# Patient Record
Sex: FEMALE | Race: WHITE | NOT HISPANIC OR LATINO | Employment: UNEMPLOYED | URBAN - NONMETROPOLITAN AREA
[De-identification: names, ages, dates, MRNs, and addresses within clinical notes are randomized per-mention and may not be internally consistent; named-entity substitution may affect disease eponyms.]

---

## 2017-11-13 ENCOUNTER — HOSPITAL ENCOUNTER (OUTPATIENT)
Dept: GENERAL RADIOLOGY | Facility: HOSPITAL | Age: 52
Discharge: HOME | End: 2017-11-13
Admitting: INTERNAL MEDICINE

## 2017-11-13 ENCOUNTER — TRANSCRIBE ORDERS (OUTPATIENT)
Dept: ADMINISTRATIVE | Facility: HOSPITAL | Age: 52
End: 2017-11-13

## 2017-11-13 DIAGNOSIS — L40.9 PSORIASIS: ICD-10-CM

## 2017-11-13 DIAGNOSIS — M15.0 PRIMARY GENERALIZED (OSTEO)ARTHRITIS: ICD-10-CM

## 2017-11-13 DIAGNOSIS — M13.0 POLYARTHROPATHY: Primary | ICD-10-CM

## 2017-11-13 PROCEDURE — 73630 X-RAY EXAM OF FOOT: CPT

## 2017-11-13 PROCEDURE — 73120 X-RAY EXAM OF HAND: CPT

## 2018-04-23 ENCOUNTER — HOSPITAL ENCOUNTER (INPATIENT)
Facility: HOSPITAL | Age: 53
LOS: 6 days | Discharge: HOME | End: 2018-04-30
Attending: EMERGENCY MEDICINE | Admitting: INTERNAL MEDICINE

## 2018-04-23 DIAGNOSIS — Z78.9 FAILURE OF OUTPATIENT TREATMENT: ICD-10-CM

## 2018-04-23 DIAGNOSIS — G47.34 NOCTURNAL HYPOXIA: ICD-10-CM

## 2018-04-23 DIAGNOSIS — L03.115 CELLULITIS OF RIGHT LOWER EXTREMITY: Primary | ICD-10-CM

## 2018-04-23 DIAGNOSIS — J44.9 CHRONIC OBSTRUCTIVE PULMONARY DISEASE, UNSPECIFIED COPD TYPE (HCC): Chronic | ICD-10-CM

## 2018-04-23 DIAGNOSIS — J45.909 ASTHMA, UNSPECIFIED ASTHMA SEVERITY, UNSPECIFIED WHETHER COMPLICATED, UNSPECIFIED WHETHER PERSISTENT: Chronic | ICD-10-CM

## 2018-04-23 DIAGNOSIS — Z59.00 HOMELESS: ICD-10-CM

## 2018-04-23 DIAGNOSIS — E66.01 MORBID OBESITY WITH BMI OF 50.0-59.9, ADULT (HCC): Chronic | ICD-10-CM

## 2018-04-23 LAB
BASOPHILS # BLD AUTO: 0.03 10*3/MM3 (ref 0–0.2)
BASOPHILS NFR BLD AUTO: 0.5 % (ref 0–1)
DEPRECATED RDW RBC AUTO: 49.2 FL (ref 37–54)
EOSINOPHIL # BLD AUTO: 0.09 10*3/MM3 (ref 0–0.3)
EOSINOPHIL NFR BLD AUTO: 1.4 % (ref 0–3)
ERYTHROCYTE [DISTWIDTH] IN BLOOD BY AUTOMATED COUNT: 15.4 % (ref 11.3–14.5)
HCT VFR BLD AUTO: 39.9 % (ref 34.5–44)
HGB BLD-MCNC: 12.1 G/DL (ref 11.5–15.5)
IMM GRANULOCYTES # BLD: 0.04 10*3/MM3 (ref 0–0.03)
IMM GRANULOCYTES NFR BLD: 0.6 % (ref 0–0.6)
LYMPHOCYTES # BLD AUTO: 1.81 10*3/MM3 (ref 0.6–4.8)
LYMPHOCYTES NFR BLD AUTO: 27.6 % (ref 24–44)
MCH RBC QN AUTO: 26.5 PG (ref 27–31)
MCHC RBC AUTO-ENTMCNC: 30.3 G/DL (ref 32–36)
MCV RBC AUTO: 87.3 FL (ref 80–99)
MONOCYTES # BLD AUTO: 0.7 10*3/MM3 (ref 0–1)
MONOCYTES NFR BLD AUTO: 10.7 % (ref 0–12)
NEUTROPHILS # BLD AUTO: 3.93 10*3/MM3 (ref 1.5–8.3)
NEUTROPHILS NFR BLD AUTO: 59.8 % (ref 41–71)
PLATELET # BLD AUTO: 256 10*3/MM3 (ref 150–450)
PMV BLD AUTO: 9.5 FL (ref 6–12)
RBC # BLD AUTO: 4.57 10*6/MM3 (ref 3.89–5.14)
WBC NRBC COR # BLD: 6.56 10*3/MM3 (ref 3.5–10.8)

## 2018-04-23 PROCEDURE — 25010000002 CEFTRIAXONE PER 250 MG: Performed by: EMERGENCY MEDICINE

## 2018-04-23 PROCEDURE — 80053 COMPREHEN METABOLIC PANEL: CPT | Performed by: EMERGENCY MEDICINE

## 2018-04-23 PROCEDURE — 99284 EMERGENCY DEPT VISIT MOD MDM: CPT

## 2018-04-23 PROCEDURE — 25010000002 VANCOMYCIN: Performed by: EMERGENCY MEDICINE

## 2018-04-23 PROCEDURE — 87040 BLOOD CULTURE FOR BACTERIA: CPT | Performed by: EMERGENCY MEDICINE

## 2018-04-23 PROCEDURE — 85025 COMPLETE CBC W/AUTO DIFF WBC: CPT | Performed by: EMERGENCY MEDICINE

## 2018-04-23 RX ORDER — SODIUM CHLORIDE 9 MG/ML
125 INJECTION, SOLUTION INTRAVENOUS CONTINUOUS
Status: DISCONTINUED | OUTPATIENT
Start: 2018-04-23 | End: 2018-04-24

## 2018-04-23 RX ORDER — SODIUM CHLORIDE 0.9 % (FLUSH) 0.9 %
10 SYRINGE (ML) INJECTION AS NEEDED
Status: DISCONTINUED | OUTPATIENT
Start: 2018-04-23 | End: 2018-04-30 | Stop reason: HOSPADM

## 2018-04-23 RX ORDER — FOLIC ACID 1 MG/1
1 TABLET ORAL DAILY
COMMUNITY

## 2018-04-23 RX ORDER — CEFTRIAXONE SODIUM 1 G/50ML
1 INJECTION, SOLUTION INTRAVENOUS ONCE
Status: COMPLETED | OUTPATIENT
Start: 2018-04-23 | End: 2018-04-23

## 2018-04-23 RX ORDER — ALBUTEROL SULFATE 90 UG/1
2 AEROSOL, METERED RESPIRATORY (INHALATION) EVERY 4 HOURS PRN
COMMUNITY
End: 2018-06-28 | Stop reason: SDUPTHER

## 2018-04-23 RX ADMIN — CEFTRIAXONE SODIUM 1 G: 1 INJECTION, SOLUTION INTRAVENOUS at 22:34

## 2018-04-23 RX ADMIN — VANCOMYCIN HYDROCHLORIDE 2000 MG: 10 INJECTION, POWDER, LYOPHILIZED, FOR SOLUTION INTRAVENOUS at 23:10

## 2018-04-23 RX ADMIN — SODIUM CHLORIDE 125 ML/HR: 9 INJECTION, SOLUTION INTRAVENOUS at 22:32

## 2018-04-23 SDOH — ECONOMIC STABILITY - HOUSING INSECURITY: HOMELESSNESS UNSPECIFIED: Z59.00

## 2018-04-24 ENCOUNTER — APPOINTMENT (OUTPATIENT)
Dept: CARDIOLOGY | Facility: HOSPITAL | Age: 53
End: 2018-04-24

## 2018-04-24 PROBLEM — J44.9 COPD (CHRONIC OBSTRUCTIVE PULMONARY DISEASE) (HCC): Chronic | Status: ACTIVE | Noted: 2018-04-24

## 2018-04-24 PROBLEM — J45.909 ASTHMA: Chronic | Status: ACTIVE | Noted: 2018-04-24

## 2018-04-24 PROBLEM — Z59.00 HOMELESSNESS: Chronic | Status: ACTIVE | Noted: 2018-04-24

## 2018-04-24 PROBLEM — L03.90 CELLULITIS: Status: ACTIVE | Noted: 2018-04-24

## 2018-04-24 PROBLEM — L40.50 PSORIATIC ARTHRITIS (HCC): Chronic | Status: ACTIVE | Noted: 2018-04-24

## 2018-04-24 PROBLEM — B37.2 INTERTRIGINOUS CANDIDIASIS: Chronic | Status: ACTIVE | Noted: 2018-04-24

## 2018-04-24 PROBLEM — I10 HTN (HYPERTENSION): Chronic | Status: ACTIVE | Noted: 2018-04-24

## 2018-04-24 PROBLEM — E78.5 HLD (HYPERLIPIDEMIA): Chronic | Status: ACTIVE | Noted: 2018-04-24

## 2018-04-24 PROBLEM — L03.115 CELLULITIS OF RIGHT LOWER EXTREMITY: Status: ACTIVE | Noted: 2018-04-24

## 2018-04-24 PROBLEM — E66.01 MORBID OBESITY WITH BMI OF 50.0-59.9, ADULT (HCC): Chronic | Status: ACTIVE | Noted: 2018-04-24

## 2018-04-24 LAB
ALBUMIN SERPL-MCNC: 3.6 G/DL (ref 3.2–4.8)
ALBUMIN/GLOB SERPL: 0.9 G/DL (ref 1.5–2.5)
ALP SERPL-CCNC: 106 U/L (ref 25–100)
ALT SERPL W P-5'-P-CCNC: 14 U/L (ref 7–40)
AMPHET+METHAMPHET UR QL: NEGATIVE
AMPHETAMINES UR QL: NEGATIVE
ANION GAP SERPL CALCULATED.3IONS-SCNC: 4 MMOL/L (ref 3–11)
ARTICHOKE IGE QN: 117 MG/DL (ref 0–130)
AST SERPL-CCNC: 15 U/L (ref 0–33)
BARBITURATES UR QL SCN: NEGATIVE
BENZODIAZ UR QL SCN: NEGATIVE
BH CV ECHO MEAS - AO ROOT AREA (BSA CORRECTED): 1.1
BH CV ECHO MEAS - AO ROOT AREA: 5.2 CM^2
BH CV ECHO MEAS - AO ROOT DIAM: 2.6 CM
BH CV ECHO MEAS - BSA(HAYCOCK): 2.5 M^2
BH CV ECHO MEAS - BSA: 2.2 M^2
BH CV ECHO MEAS - BZI_BMI: 53.4 KILOGRAMS/M^2
BH CV ECHO MEAS - BZI_METRIC_HEIGHT: 157.5 CM
BH CV ECHO MEAS - BZI_METRIC_WEIGHT: 132.5 KG
BH CV ECHO MEAS - EDV(CUBED): 128 ML
BH CV ECHO MEAS - EDV(TEICH): 120.4 ML
BH CV ECHO MEAS - EF(CUBED): 66.3 %
BH CV ECHO MEAS - EF(TEICH): 57.6 %
BH CV ECHO MEAS - ESV(CUBED): 43.1 ML
BH CV ECHO MEAS - ESV(TEICH): 51.1 ML
BH CV ECHO MEAS - FS: 30.4 %
BH CV ECHO MEAS - IVS/LVPW: 1
BH CV ECHO MEAS - IVSD: 1.3 CM
BH CV ECHO MEAS - LA DIMENSION: 4.4 CM
BH CV ECHO MEAS - LA/AO: 1.7
BH CV ECHO MEAS - LV MASS(C)D: 269.3 GRAMS
BH CV ECHO MEAS - LV MASS(C)DI: 119.9 GRAMS/M^2
BH CV ECHO MEAS - LVIDD: 5 CM
BH CV ECHO MEAS - LVIDS: 3.5 CM
BH CV ECHO MEAS - LVPWD: 1.3 CM
BH CV ECHO MEAS - MV A MAX VEL: 85.4 CM/SEC
BH CV ECHO MEAS - MV DEC SLOPE: 621.5 CM/SEC^2
BH CV ECHO MEAS - MV DEC TIME: 0.19 SEC
BH CV ECHO MEAS - MV E MAX VEL: 114.5 CM/SEC
BH CV ECHO MEAS - MV E/A: 1.3
BH CV ECHO MEAS - MV P1/2T MAX VEL: 123.7 CM/SEC
BH CV ECHO MEAS - MV P1/2T: 58.3 MSEC
BH CV ECHO MEAS - MVA P1/2T LCG: 1.8 CM^2
BH CV ECHO MEAS - MVA(P1/2T): 3.8 CM^2
BH CV ECHO MEAS - PA ACC SLOPE: 914.3 CM/SEC^2
BH CV ECHO MEAS - PA ACC TIME: 0.1 SEC
BH CV ECHO MEAS - PA PR(ACCEL): 35 MMHG
BH CV ECHO MEAS - RV MAX PG: 2.5 MMHG
BH CV ECHO MEAS - RV V1 MAX: 78.5 CM/SEC
BH CV ECHO MEAS - SI(CUBED): 37.8 ML/M^2
BH CV ECHO MEAS - SI(TEICH): 30.9 ML/M^2
BH CV ECHO MEAS - SV(CUBED): 84.8 ML
BH CV ECHO MEAS - SV(TEICH): 69.3 ML
BH CV ECHO MEAS - TAPSE (>1.6): 2.1 CM2
BH CV GRAFT BRACHIAL PRESSURE LEFT: 116 MMHG
BH CV LEA LEFT ANT TIBIAL A DISTAL PSV: 85.5 CM/S
BH CV LEA LEFT ANT TIBIAL A MID PSV: 81.7 CM/S
BH CV LEA LEFT ANT TIBIAL A PROX PSV: 95.5 CM/S
BH CV LEA LEFT CFA DISTAL PSV: 93 CM/S
BH CV LEA LEFT DFA PROX PSV: 70.4 CM/S
BH CV LEA LEFT PERONEAL  MID PSV: 103 CM/S
BH CV LEA LEFT POPITEAL A  DISTAL PSV: 107 CM/S
BH CV LEA LEFT POPITEAL A  PROX PSV: 128 CM/S
BH CV LEA LEFT PTA DISTAL PSV: 70.4 CM/S
BH CV LEA LEFT PTA MID PSV: 75.3 CM/S
BH CV LEA LEFT PTA PRESSURE: 138 MMHG
BH CV LEA LEFT PTA PROX PSV: 76.3 CM/S
BH CV LEA LEFT SFA DISTAL PSV: 106 CM/S
BH CV LEA LEFT SFA MID PSV: 121 CM/S
BH CV LEA LEFT SFA PROX PSV: 104 CM/S
BH CV LEA RIGHT ANT TIBIAL A DISTAL PSV: 87 CM/S
BH CV LEA RIGHT ANT TIBIAL A MID PSV: 98.8 CM/S
BH CV LEA RIGHT ANT TIBIAL A PROX PSV: 154 CM/S
BH CV LEA RIGHT CFA DISTAL PSV: 145 CM/S
BH CV LEA RIGHT DFA PROX PSV: 77.8 CM/S
BH CV LEA RIGHT PERONEAL  MID PSV: 102 CM/S
BH CV LEA RIGHT POPITEAL A  DISTAL PSV: 130 CM/S
BH CV LEA RIGHT POPITEAL A  PROX PSV: 121 CM/S
BH CV LEA RIGHT PTA DISTAL PSV: 95.8 CM/S
BH CV LEA RIGHT PTA MID PSV: 150 CM/S
BH CV LEA RIGHT PTA PRESSURE: 154 MMHG
BH CV LEA RIGHT PTA PROX PSV: 135 CM/S
BH CV LEA RIGHT SFA DISTAL PSV: 131 CM/S
BH CV LEA RIGHT SFA MID PSV: 155 CM/S
BH CV LEA RIGHT SFA PROX PSV: 113 CM/S
BH CV LOWER ARTERIAL LEFT ABI RATIO: 1.2
BH CV LOWER ARTERIAL RIGHT ABI RATIO: 1.3
BH CV LOWER VASCULAR LEFT COMMON FEMORAL AUGMENT: NORMAL
BH CV LOWER VASCULAR LEFT COMMON FEMORAL COMPRESS: NORMAL
BH CV LOWER VASCULAR LEFT COMMON FEMORAL PHASIC: NORMAL
BH CV LOWER VASCULAR LEFT COMMON FEMORAL SPONT: NORMAL
BH CV LOWER VASCULAR LEFT DISTAL FEMORAL COMPRESS: NORMAL
BH CV LOWER VASCULAR LEFT GASTRONEMIUS COMPRESS: NORMAL
BH CV LOWER VASCULAR LEFT GREATER SAPH AK COMPRESS: NORMAL
BH CV LOWER VASCULAR LEFT GREATER SAPH BK COMPRESS: NORMAL
BH CV LOWER VASCULAR LEFT LESSER SAPH COMPRESS: NORMAL
BH CV LOWER VASCULAR LEFT MID FEMORAL AUGMENT: NORMAL
BH CV LOWER VASCULAR LEFT MID FEMORAL COMPRESS: NORMAL
BH CV LOWER VASCULAR LEFT MID FEMORAL PHASIC: NORMAL
BH CV LOWER VASCULAR LEFT MID FEMORAL SPONT: NORMAL
BH CV LOWER VASCULAR LEFT PERONEAL COMPRESS: NORMAL
BH CV LOWER VASCULAR LEFT POPLITEAL AUGMENT: NORMAL
BH CV LOWER VASCULAR LEFT POPLITEAL COMPRESS: NORMAL
BH CV LOWER VASCULAR LEFT POPLITEAL PHASIC: NORMAL
BH CV LOWER VASCULAR LEFT POPLITEAL SPONT: NORMAL
BH CV LOWER VASCULAR LEFT POSTERIOR TIBIAL COMPRESS: NORMAL
BH CV LOWER VASCULAR LEFT PROXIMAL FEMORAL COMPRESS: NORMAL
BH CV LOWER VASCULAR LEFT SAPHENOFEMORAL JUNCTION AUGMENT: NORMAL
BH CV LOWER VASCULAR LEFT SAPHENOFEMORAL JUNCTION COMPRESS: NORMAL
BH CV LOWER VASCULAR LEFT SAPHENOFEMORAL JUNCTION PHASIC: NORMAL
BH CV LOWER VASCULAR LEFT SAPHENOFEMORAL JUNCTION SPONT: NORMAL
BH CV LOWER VASCULAR RIGHT COMMON FEMORAL AUGMENT: NORMAL
BH CV LOWER VASCULAR RIGHT COMMON FEMORAL COMPRESS: NORMAL
BH CV LOWER VASCULAR RIGHT COMMON FEMORAL PHASIC: NORMAL
BH CV LOWER VASCULAR RIGHT COMMON FEMORAL SPONT: NORMAL
BH CV LOWER VASCULAR RIGHT DISTAL FEMORAL COMPRESS: NORMAL
BH CV LOWER VASCULAR RIGHT GASTRONEMIUS COMPRESS: NORMAL
BH CV LOWER VASCULAR RIGHT GREATER SAPH AK COMPRESS: NORMAL
BH CV LOWER VASCULAR RIGHT GREATER SAPH BK COMPRESS: NORMAL
BH CV LOWER VASCULAR RIGHT LESSER SAPH COMPRESS: NORMAL
BH CV LOWER VASCULAR RIGHT MID FEMORAL AUGMENT: NORMAL
BH CV LOWER VASCULAR RIGHT MID FEMORAL COMPRESS: NORMAL
BH CV LOWER VASCULAR RIGHT MID FEMORAL PHASIC: NORMAL
BH CV LOWER VASCULAR RIGHT MID FEMORAL SPONT: NORMAL
BH CV LOWER VASCULAR RIGHT PERONEAL COMPRESS: NORMAL
BH CV LOWER VASCULAR RIGHT POPLITEAL AUGMENT: NORMAL
BH CV LOWER VASCULAR RIGHT POPLITEAL COMPRESS: NORMAL
BH CV LOWER VASCULAR RIGHT POPLITEAL PHASIC: NORMAL
BH CV LOWER VASCULAR RIGHT POPLITEAL SPONT: NORMAL
BH CV LOWER VASCULAR RIGHT POSTERIOR TIBIAL COMPRESS: NORMAL
BH CV LOWER VASCULAR RIGHT PROXIMAL FEMORAL COMPRESS: NORMAL
BH CV LOWER VASCULAR RIGHT SAPHENOFEMORAL JUNCTION AUGMENT: NORMAL
BH CV LOWER VASCULAR RIGHT SAPHENOFEMORAL JUNCTION COMPRESS: NORMAL
BH CV LOWER VASCULAR RIGHT SAPHENOFEMORAL JUNCTION PHASIC: NORMAL
BH CV LOWER VASCULAR RIGHT SAPHENOFEMORAL JUNCTION SPONT: NORMAL
BH CV POP FLUID COLLECT LEFT: 1
BH CV XLRA - RV BASE: 4 CM
BH CV XLRA - RV LENGTH: 7.4 CM
BH CV XLRA - RV MID: 3.7 CM
BH CV XLRA - TDI S': 11.7 CM/SEC
BILIRUB SERPL-MCNC: 0.4 MG/DL (ref 0.3–1.2)
BILIRUB UR QL STRIP: NEGATIVE
BNP SERPL-MCNC: 64 PG/ML (ref 0–100)
BUN BLD-MCNC: 11 MG/DL (ref 9–23)
BUN/CREAT SERPL: 13.8 (ref 7–25)
BUPRENORPHINE SERPL-MCNC: NEGATIVE NG/ML
CALCIUM SPEC-SCNC: 8.9 MG/DL (ref 8.7–10.4)
CANNABINOIDS SERPL QL: POSITIVE
CHLORIDE SERPL-SCNC: 106 MMOL/L (ref 99–109)
CHOLEST SERPL-MCNC: 165 MG/DL (ref 0–200)
CLARITY UR: CLEAR
CO2 SERPL-SCNC: 29 MMOL/L (ref 20–31)
COCAINE UR QL: NEGATIVE
COLOR UR: YELLOW
CREAT BLD-MCNC: 0.8 MG/DL (ref 0.6–1.3)
CRP SERPL-MCNC: 0.87 MG/DL (ref 0–1)
DIST ATA PSV LEFT: -86.1 CM/SEC
DIST POP A PSV LEFT: -107.5 CM/SEC
DIST POP A PSV RIGHT: 130.4 CM/SEC
DIST SFA PSV LEFT: -106.2 CM/SEC
DIST SFA PSV RIGHT: -132 CM/SEC
ERYTHROCYTE [SEDIMENTATION RATE] IN BLOOD: 65 MM/HR (ref 0–30)
GFR SERPL CREATININE-BSD FRML MDRD: 76 ML/MIN/1.73
GLOBULIN UR ELPH-MCNC: 4 GM/DL
GLUCOSE BLD-MCNC: 95 MG/DL (ref 70–100)
GLUCOSE UR STRIP-MCNC: NEGATIVE MG/DL
HBA1C MFR BLD: 5.7 % (ref 4.8–5.6)
HDLC SERPL-MCNC: 27 MG/DL (ref 40–60)
HGB UR QL STRIP.AUTO: NEGATIVE
KETONES UR QL STRIP: NEGATIVE
LEFT ATRIUM VOLUME INDEX: 13.8 ML/M2
LEFT ATRIUM VOLUME: 31 CM3
LEFT CFA PROX SYS PSV: 93.7 CM/SEC
LEUKOCYTE ESTERASE UR QL STRIP.AUTO: NEGATIVE
LV EF 2D ECHO EST: 60 %
MAGNESIUM SERPL-MCNC: 2 MG/DL (ref 1.3–2.7)
MAXIMAL PREDICTED HEART RATE: 169 BPM
METHADONE UR QL SCN: NEGATIVE
MID ATA PSV LEFT: -82.3 CM/SEC
MID SFA PSV LEFT: -121.3 CM/SEC
MID SFA PSV RIGHT: -155.6 CM/SEC
NITRITE UR QL STRIP: NEGATIVE
OPIATES UR QL: NEGATIVE
OXYCODONE UR QL SCN: NEGATIVE
PCP UR QL SCN: NEGATIVE
PH UR STRIP.AUTO: 5.5 [PH] (ref 5–8)
POTASSIUM BLD-SCNC: 3.9 MMOL/L (ref 3.5–5.5)
PROCALCITONIN SERPL-MCNC: <0.05 NG/ML
PROPOXYPH UR QL: NEGATIVE
PROT SERPL-MCNC: 7.6 G/DL (ref 5.7–8.2)
PROT UR QL STRIP: NEGATIVE
PROX ATA PSV LEFT: -96.2 CM/SEC
PROX PFA PSV LEFT: -71 CM/SEC
PROX PFA PSV RIGHT: -78.6 CM/SEC
PROX POP A PSV LEFT: 128.9 CM/SEC
PROX POP A PSV RIGHT: 121.8 CM/SEC
PROX SFA PSV LEFT: -105 CM/SEC
PROX SFA PSV RIGHT: 113.9 CM/SEC
RIGHT CFA PROX SYS PSV: 145.4 CM/SEC
SODIUM BLD-SCNC: 139 MMOL/L (ref 132–146)
SP GR UR STRIP: 1.01 (ref 1–1.03)
STRESS TARGET HR: 144 BPM
TRICYCLICS UR QL SCN: NEGATIVE
TRIGL SERPL-MCNC: 174 MG/DL (ref 0–150)
TROPONIN I SERPL-MCNC: <0.006 NG/ML
TSH SERPL DL<=0.05 MIU/L-ACNC: 2.33 MIU/ML (ref 0.35–5.35)
UROBILINOGEN UR QL STRIP: NORMAL

## 2018-04-24 PROCEDURE — 99223 1ST HOSP IP/OBS HIGH 75: CPT | Performed by: INTERNAL MEDICINE

## 2018-04-24 PROCEDURE — 25010000003 CEFTRIAXONE PER 250 MG

## 2018-04-24 PROCEDURE — 84145 PROCALCITONIN (PCT): CPT | Performed by: NURSE PRACTITIONER

## 2018-04-24 PROCEDURE — 83735 ASSAY OF MAGNESIUM: CPT | Performed by: NURSE PRACTITIONER

## 2018-04-24 PROCEDURE — 80306 DRUG TEST PRSMV INSTRMNT: CPT | Performed by: NURSE PRACTITIONER

## 2018-04-24 PROCEDURE — 85652 RBC SED RATE AUTOMATED: CPT | Performed by: NURSE PRACTITIONER

## 2018-04-24 PROCEDURE — 84443 ASSAY THYROID STIM HORMONE: CPT | Performed by: NURSE PRACTITIONER

## 2018-04-24 PROCEDURE — 25010000002 VANCOMYCIN

## 2018-04-24 PROCEDURE — 94799 UNLISTED PULMONARY SVC/PX: CPT

## 2018-04-24 PROCEDURE — 93306 TTE W/DOPPLER COMPLETE: CPT | Performed by: INTERNAL MEDICINE

## 2018-04-24 PROCEDURE — 93970 EXTREMITY STUDY: CPT | Performed by: INTERNAL MEDICINE

## 2018-04-24 PROCEDURE — 93970 EXTREMITY STUDY: CPT

## 2018-04-24 PROCEDURE — 25010000002 HEPARIN (PORCINE) PER 1000 UNITS: Performed by: NURSE PRACTITIONER

## 2018-04-24 PROCEDURE — 93306 TTE W/DOPPLER COMPLETE: CPT

## 2018-04-24 PROCEDURE — 94640 AIRWAY INHALATION TREATMENT: CPT

## 2018-04-24 PROCEDURE — 83036 HEMOGLOBIN GLYCOSYLATED A1C: CPT | Performed by: NURSE PRACTITIONER

## 2018-04-24 PROCEDURE — 81003 URINALYSIS AUTO W/O SCOPE: CPT | Performed by: NURSE PRACTITIONER

## 2018-04-24 PROCEDURE — 83880 ASSAY OF NATRIURETIC PEPTIDE: CPT | Performed by: NURSE PRACTITIONER

## 2018-04-24 PROCEDURE — 93925 LOWER EXTREMITY STUDY: CPT

## 2018-04-24 PROCEDURE — 80061 LIPID PANEL: CPT | Performed by: NURSE PRACTITIONER

## 2018-04-24 PROCEDURE — 86140 C-REACTIVE PROTEIN: CPT | Performed by: NURSE PRACTITIONER

## 2018-04-24 PROCEDURE — 93925 LOWER EXTREMITY STUDY: CPT | Performed by: INTERNAL MEDICINE

## 2018-04-24 PROCEDURE — 84484 ASSAY OF TROPONIN QUANT: CPT | Performed by: NURSE PRACTITIONER

## 2018-04-24 RX ORDER — CEFTRIAXONE SODIUM 2 G/50ML
2 INJECTION, SOLUTION INTRAVENOUS EVERY 24 HOURS
Status: DISCONTINUED | OUTPATIENT
Start: 2018-04-24 | End: 2018-04-30 | Stop reason: HOSPADM

## 2018-04-24 RX ORDER — ACETAMINOPHEN 325 MG/1
650 TABLET ORAL EVERY 4 HOURS PRN
Status: DISCONTINUED | OUTPATIENT
Start: 2018-04-24 | End: 2018-04-30 | Stop reason: HOSPADM

## 2018-04-24 RX ORDER — BISACODYL 5 MG/1
5 TABLET, DELAYED RELEASE ORAL DAILY PRN
Status: DISCONTINUED | OUTPATIENT
Start: 2018-04-24 | End: 2018-04-30 | Stop reason: HOSPADM

## 2018-04-24 RX ORDER — ALBUTEROL SULFATE 2.5 MG/3ML
2.5 SOLUTION RESPIRATORY (INHALATION) 4 TIMES DAILY PRN
Status: DISCONTINUED | OUTPATIENT
Start: 2018-04-24 | End: 2018-04-30 | Stop reason: HOSPADM

## 2018-04-24 RX ORDER — CASTOR OIL AND BALSAM, PERU 788; 87 MG/G; MG/G
OINTMENT TOPICAL EVERY 12 HOURS SCHEDULED
Status: DISCONTINUED | OUTPATIENT
Start: 2018-04-24 | End: 2018-04-30 | Stop reason: HOSPADM

## 2018-04-24 RX ORDER — FOLIC ACID 1 MG/1
1 TABLET ORAL DAILY
Status: DISCONTINUED | OUTPATIENT
Start: 2018-04-24 | End: 2018-04-30 | Stop reason: HOSPADM

## 2018-04-24 RX ORDER — ONDANSETRON 4 MG/1
4 TABLET, FILM COATED ORAL EVERY 6 HOURS PRN
Status: DISCONTINUED | OUTPATIENT
Start: 2018-04-24 | End: 2018-04-30 | Stop reason: HOSPADM

## 2018-04-24 RX ORDER — CASTOR OIL AND BALSAM, PERU 788; 87 MG/G; MG/G
OINTMENT TOPICAL AS NEEDED
Status: DISCONTINUED | OUTPATIENT
Start: 2018-04-24 | End: 2018-04-30 | Stop reason: HOSPADM

## 2018-04-24 RX ORDER — L.ACID,PARA/B.BIFIDUM/S.THERM 8B CELL
1 CAPSULE ORAL DAILY
Status: DISCONTINUED | OUTPATIENT
Start: 2018-04-24 | End: 2018-04-30 | Stop reason: HOSPADM

## 2018-04-24 RX ORDER — IPRATROPIUM BROMIDE AND ALBUTEROL SULFATE 2.5; .5 MG/3ML; MG/3ML
3 SOLUTION RESPIRATORY (INHALATION)
Status: DISCONTINUED | OUTPATIENT
Start: 2018-04-24 | End: 2018-04-30 | Stop reason: HOSPADM

## 2018-04-24 RX ORDER — CEFTRIAXONE SODIUM 1 G/50ML
1 INJECTION, SOLUTION INTRAVENOUS EVERY 24 HOURS
Status: DISCONTINUED | OUTPATIENT
Start: 2018-04-24 | End: 2018-04-24

## 2018-04-24 RX ORDER — NYSTATIN 100000 [USP'U]/G
POWDER TOPICAL EVERY 12 HOURS SCHEDULED
Status: DISCONTINUED | OUTPATIENT
Start: 2018-04-24 | End: 2018-04-30 | Stop reason: HOSPADM

## 2018-04-24 RX ORDER — HEPARIN SODIUM 5000 [USP'U]/ML
5000 INJECTION, SOLUTION INTRAVENOUS; SUBCUTANEOUS EVERY 8 HOURS SCHEDULED
Status: DISCONTINUED | OUTPATIENT
Start: 2018-04-24 | End: 2018-04-30 | Stop reason: HOSPADM

## 2018-04-24 RX ORDER — DOCUSATE SODIUM 100 MG/1
100 CAPSULE, LIQUID FILLED ORAL 2 TIMES DAILY PRN
Status: DISCONTINUED | OUTPATIENT
Start: 2018-04-24 | End: 2018-04-30 | Stop reason: HOSPADM

## 2018-04-24 RX ORDER — FAMOTIDINE 20 MG/1
20 TABLET, FILM COATED ORAL 2 TIMES DAILY PRN
Status: DISCONTINUED | OUTPATIENT
Start: 2018-04-24 | End: 2018-04-30 | Stop reason: HOSPADM

## 2018-04-24 RX ORDER — SODIUM CHLORIDE 0.9 % (FLUSH) 0.9 %
1-10 SYRINGE (ML) INJECTION AS NEEDED
Status: DISCONTINUED | OUTPATIENT
Start: 2018-04-24 | End: 2018-04-30 | Stop reason: HOSPADM

## 2018-04-24 RX ORDER — ONDANSETRON 2 MG/ML
4 INJECTION INTRAMUSCULAR; INTRAVENOUS EVERY 6 HOURS PRN
Status: DISCONTINUED | OUTPATIENT
Start: 2018-04-24 | End: 2018-04-30 | Stop reason: HOSPADM

## 2018-04-24 RX ORDER — SODIUM CHLORIDE 9 MG/ML
125 INJECTION, SOLUTION INTRAVENOUS CONTINUOUS
Status: ACTIVE | OUTPATIENT
Start: 2018-04-24 | End: 2018-04-24

## 2018-04-24 RX ORDER — NICOTINE 21 MG/24HR
1 PATCH, TRANSDERMAL 24 HOURS TRANSDERMAL DAILY PRN
Status: DISCONTINUED | OUTPATIENT
Start: 2018-04-24 | End: 2018-04-30 | Stop reason: HOSPADM

## 2018-04-24 RX ADMIN — IPRATROPIUM BROMIDE AND ALBUTEROL SULFATE 3 ML: 2.5; .5 SOLUTION RESPIRATORY (INHALATION) at 19:55

## 2018-04-24 RX ADMIN — HEPARIN SODIUM 5000 UNITS: 5000 INJECTION, SOLUTION INTRAVENOUS; SUBCUTANEOUS at 05:18

## 2018-04-24 RX ADMIN — IPRATROPIUM BROMIDE AND ALBUTEROL SULFATE 3 ML: 2.5; .5 SOLUTION RESPIRATORY (INHALATION) at 12:13

## 2018-04-24 RX ADMIN — CASTOR OIL AND BALSAM, PERU: 788; 87 OINTMENT TOPICAL at 20:55

## 2018-04-24 RX ADMIN — CEFTRIAXONE SODIUM 2 G: 2 INJECTION, SOLUTION INTRAVENOUS at 20:53

## 2018-04-24 RX ADMIN — IPRATROPIUM BROMIDE AND ALBUTEROL SULFATE 3 ML: 2.5; .5 SOLUTION RESPIRATORY (INHALATION) at 23:37

## 2018-04-24 RX ADMIN — IPRATROPIUM BROMIDE AND ALBUTEROL SULFATE 3 ML: 2.5; .5 SOLUTION RESPIRATORY (INHALATION) at 06:47

## 2018-04-24 RX ADMIN — Medication 1 CAPSULE: at 08:52

## 2018-04-24 RX ADMIN — IPRATROPIUM BROMIDE AND ALBUTEROL SULFATE 3 ML: 2.5; .5 SOLUTION RESPIRATORY (INHALATION) at 15:26

## 2018-04-24 RX ADMIN — FOLIC ACID 1 MG: 1 TABLET ORAL at 08:52

## 2018-04-24 RX ADMIN — HEPARIN SODIUM 5000 UNITS: 5000 INJECTION, SOLUTION INTRAVENOUS; SUBCUTANEOUS at 20:54

## 2018-04-24 RX ADMIN — SODIUM CHLORIDE 125 ML/HR: 9 INJECTION, SOLUTION INTRAVENOUS at 05:13

## 2018-04-24 RX ADMIN — VANCOMYCIN HYDROCHLORIDE 1500 MG: 10 INJECTION, POWDER, LYOPHILIZED, FOR SOLUTION INTRAVENOUS at 13:33

## 2018-04-25 PROCEDURE — 25010000003 CEFTRIAXONE PER 250 MG

## 2018-04-25 PROCEDURE — 25010000002 HEPARIN (PORCINE) PER 1000 UNITS: Performed by: NURSE PRACTITIONER

## 2018-04-25 PROCEDURE — 94640 AIRWAY INHALATION TREATMENT: CPT

## 2018-04-25 PROCEDURE — 94799 UNLISTED PULMONARY SVC/PX: CPT

## 2018-04-25 PROCEDURE — 99233 SBSQ HOSP IP/OBS HIGH 50: CPT | Performed by: INTERNAL MEDICINE

## 2018-04-25 RX ADMIN — ACETAMINOPHEN 650 MG: 325 TABLET ORAL at 01:49

## 2018-04-25 RX ADMIN — FOLIC ACID 1 MG: 1 TABLET ORAL at 09:27

## 2018-04-25 RX ADMIN — IPRATROPIUM BROMIDE AND ALBUTEROL SULFATE 3 ML: 2.5; .5 SOLUTION RESPIRATORY (INHALATION) at 07:51

## 2018-04-25 RX ADMIN — CASTOR OIL AND BALSAM, PERU: 788; 87 OINTMENT TOPICAL at 21:36

## 2018-04-25 RX ADMIN — ACETAMINOPHEN 650 MG: 325 TABLET ORAL at 17:10

## 2018-04-25 RX ADMIN — HEPARIN SODIUM 5000 UNITS: 5000 INJECTION, SOLUTION INTRAVENOUS; SUBCUTANEOUS at 21:37

## 2018-04-25 RX ADMIN — IPRATROPIUM BROMIDE AND ALBUTEROL SULFATE 3 ML: 2.5; .5 SOLUTION RESPIRATORY (INHALATION) at 03:01

## 2018-04-25 RX ADMIN — NYSTATIN: 100000 POWDER TOPICAL at 09:27

## 2018-04-25 RX ADMIN — IPRATROPIUM BROMIDE AND ALBUTEROL SULFATE 3 ML: 2.5; .5 SOLUTION RESPIRATORY (INHALATION) at 11:49

## 2018-04-25 RX ADMIN — NYSTATIN: 100000 POWDER TOPICAL at 21:37

## 2018-04-25 RX ADMIN — ACETAMINOPHEN 650 MG: 325 TABLET ORAL at 10:38

## 2018-04-25 RX ADMIN — Medication 1 CAPSULE: at 09:27

## 2018-04-25 RX ADMIN — CASTOR OIL AND BALSAM, PERU: 788; 87 OINTMENT TOPICAL at 09:27

## 2018-04-25 RX ADMIN — CEFTRIAXONE SODIUM 2 G: 2 INJECTION, SOLUTION INTRAVENOUS at 21:36

## 2018-04-25 RX ADMIN — IPRATROPIUM BROMIDE AND ALBUTEROL SULFATE 3 ML: 2.5; .5 SOLUTION RESPIRATORY (INHALATION) at 15:36

## 2018-04-25 RX ADMIN — HEPARIN SODIUM 5000 UNITS: 5000 INJECTION, SOLUTION INTRAVENOUS; SUBCUTANEOUS at 06:01

## 2018-04-25 RX ADMIN — HEPARIN SODIUM 5000 UNITS: 5000 INJECTION, SOLUTION INTRAVENOUS; SUBCUTANEOUS at 17:10

## 2018-04-26 PROCEDURE — 99232 SBSQ HOSP IP/OBS MODERATE 35: CPT | Performed by: INTERNAL MEDICINE

## 2018-04-26 PROCEDURE — 94760 N-INVAS EAR/PLS OXIMETRY 1: CPT

## 2018-04-26 PROCEDURE — 94799 UNLISTED PULMONARY SVC/PX: CPT

## 2018-04-26 PROCEDURE — 25010000002 HEPARIN (PORCINE) PER 1000 UNITS: Performed by: NURSE PRACTITIONER

## 2018-04-26 PROCEDURE — 94640 AIRWAY INHALATION TREATMENT: CPT

## 2018-04-26 PROCEDURE — 25010000003 CEFTRIAXONE PER 250 MG

## 2018-04-26 RX ADMIN — HEPARIN SODIUM 5000 UNITS: 5000 INJECTION, SOLUTION INTRAVENOUS; SUBCUTANEOUS at 22:19

## 2018-04-26 RX ADMIN — CASTOR OIL AND BALSAM, PERU: 788; 87 OINTMENT TOPICAL at 22:19

## 2018-04-26 RX ADMIN — HEPARIN SODIUM 5000 UNITS: 5000 INJECTION, SOLUTION INTRAVENOUS; SUBCUTANEOUS at 06:34

## 2018-04-26 RX ADMIN — FOLIC ACID 1 MG: 1 TABLET ORAL at 09:48

## 2018-04-26 RX ADMIN — CEFTRIAXONE SODIUM 2 G: 2 INJECTION, SOLUTION INTRAVENOUS at 22:19

## 2018-04-26 RX ADMIN — IPRATROPIUM BROMIDE AND ALBUTEROL SULFATE 3 ML: 2.5; .5 SOLUTION RESPIRATORY (INHALATION) at 08:03

## 2018-04-26 RX ADMIN — IPRATROPIUM BROMIDE AND ALBUTEROL SULFATE 3 ML: 2.5; .5 SOLUTION RESPIRATORY (INHALATION) at 11:01

## 2018-04-26 RX ADMIN — IPRATROPIUM BROMIDE AND ALBUTEROL SULFATE 3 ML: 2.5; .5 SOLUTION RESPIRATORY (INHALATION) at 15:15

## 2018-04-26 RX ADMIN — IPRATROPIUM BROMIDE AND ALBUTEROL SULFATE 3 ML: 2.5; .5 SOLUTION RESPIRATORY (INHALATION) at 19:02

## 2018-04-26 RX ADMIN — HEPARIN SODIUM 5000 UNITS: 5000 INJECTION, SOLUTION INTRAVENOUS; SUBCUTANEOUS at 16:14

## 2018-04-26 RX ADMIN — Medication 1 CAPSULE: at 09:47

## 2018-04-26 RX ADMIN — NYSTATIN: 100000 POWDER TOPICAL at 22:19

## 2018-04-26 RX ADMIN — CASTOR OIL AND BALSAM, PERU: 788; 87 OINTMENT TOPICAL at 09:48

## 2018-04-26 RX ADMIN — IPRATROPIUM BROMIDE AND ALBUTEROL SULFATE 3 ML: 2.5; .5 SOLUTION RESPIRATORY (INHALATION) at 00:13

## 2018-04-26 RX ADMIN — NYSTATIN: 100000 POWDER TOPICAL at 09:48

## 2018-04-27 LAB — GLUCOSE BLDC GLUCOMTR-MCNC: 118 MG/DL (ref 70–130)

## 2018-04-27 PROCEDURE — 94799 UNLISTED PULMONARY SVC/PX: CPT

## 2018-04-27 PROCEDURE — 25010000002 HEPARIN (PORCINE) PER 1000 UNITS: Performed by: NURSE PRACTITIONER

## 2018-04-27 PROCEDURE — 25010000003 CEFTRIAXONE PER 250 MG

## 2018-04-27 PROCEDURE — 94640 AIRWAY INHALATION TREATMENT: CPT

## 2018-04-27 PROCEDURE — 94760 N-INVAS EAR/PLS OXIMETRY 1: CPT

## 2018-04-27 PROCEDURE — 82962 GLUCOSE BLOOD TEST: CPT

## 2018-04-27 PROCEDURE — 99232 SBSQ HOSP IP/OBS MODERATE 35: CPT | Performed by: INTERNAL MEDICINE

## 2018-04-27 RX ADMIN — FOLIC ACID 1 MG: 1 TABLET ORAL at 10:22

## 2018-04-27 RX ADMIN — HEPARIN SODIUM 5000 UNITS: 5000 INJECTION, SOLUTION INTRAVENOUS; SUBCUTANEOUS at 21:09

## 2018-04-27 RX ADMIN — CASTOR OIL AND BALSAM, PERU: 788; 87 OINTMENT TOPICAL at 21:45

## 2018-04-27 RX ADMIN — IPRATROPIUM BROMIDE AND ALBUTEROL SULFATE 3 ML: 2.5; .5 SOLUTION RESPIRATORY (INHALATION) at 19:02

## 2018-04-27 RX ADMIN — IPRATROPIUM BROMIDE AND ALBUTEROL SULFATE 3 ML: 2.5; .5 SOLUTION RESPIRATORY (INHALATION) at 07:52

## 2018-04-27 RX ADMIN — IPRATROPIUM BROMIDE AND ALBUTEROL SULFATE 3 ML: 2.5; .5 SOLUTION RESPIRATORY (INHALATION) at 15:10

## 2018-04-27 RX ADMIN — CASTOR OIL AND BALSAM, PERU: 788; 87 OINTMENT TOPICAL at 10:22

## 2018-04-27 RX ADMIN — NYSTATIN: 100000 POWDER TOPICAL at 10:23

## 2018-04-27 RX ADMIN — HEPARIN SODIUM 5000 UNITS: 5000 INJECTION, SOLUTION INTRAVENOUS; SUBCUTANEOUS at 14:25

## 2018-04-27 RX ADMIN — CEFTRIAXONE SODIUM 2 G: 2 INJECTION, SOLUTION INTRAVENOUS at 21:09

## 2018-04-27 RX ADMIN — IPRATROPIUM BROMIDE AND ALBUTEROL SULFATE 3 ML: 2.5; .5 SOLUTION RESPIRATORY (INHALATION) at 00:30

## 2018-04-27 RX ADMIN — Medication 1 CAPSULE: at 10:22

## 2018-04-27 RX ADMIN — HEPARIN SODIUM 5000 UNITS: 5000 INJECTION, SOLUTION INTRAVENOUS; SUBCUTANEOUS at 04:44

## 2018-04-27 RX ADMIN — NYSTATIN: 100000 POWDER TOPICAL at 21:49

## 2018-04-27 RX ADMIN — ACETAMINOPHEN 650 MG: 325 TABLET ORAL at 04:45

## 2018-04-28 LAB
BACTERIA SPEC AEROBE CULT: NORMAL
BACTERIA SPEC AEROBE CULT: NORMAL
GLUCOSE BLDC GLUCOMTR-MCNC: 108 MG/DL (ref 70–130)
GLUCOSE BLDC GLUCOMTR-MCNC: 82 MG/DL (ref 70–130)
GLUCOSE BLDC GLUCOMTR-MCNC: 93 MG/DL (ref 70–130)
GLUCOSE BLDC GLUCOMTR-MCNC: 95 MG/DL (ref 70–130)

## 2018-04-28 PROCEDURE — 99233 SBSQ HOSP IP/OBS HIGH 50: CPT | Performed by: NURSE PRACTITIONER

## 2018-04-28 PROCEDURE — 25010000003 CEFTRIAXONE PER 250 MG

## 2018-04-28 PROCEDURE — 94799 UNLISTED PULMONARY SVC/PX: CPT

## 2018-04-28 PROCEDURE — 82962 GLUCOSE BLOOD TEST: CPT

## 2018-04-28 PROCEDURE — 25010000002 HEPARIN (PORCINE) PER 1000 UNITS: Performed by: NURSE PRACTITIONER

## 2018-04-28 PROCEDURE — 94640 AIRWAY INHALATION TREATMENT: CPT

## 2018-04-28 PROCEDURE — 94760 N-INVAS EAR/PLS OXIMETRY 1: CPT

## 2018-04-28 RX ADMIN — IPRATROPIUM BROMIDE AND ALBUTEROL SULFATE 3 ML: 2.5; .5 SOLUTION RESPIRATORY (INHALATION) at 19:55

## 2018-04-28 RX ADMIN — IPRATROPIUM BROMIDE AND ALBUTEROL SULFATE 3 ML: 2.5; .5 SOLUTION RESPIRATORY (INHALATION) at 23:57

## 2018-04-28 RX ADMIN — IPRATROPIUM BROMIDE AND ALBUTEROL SULFATE 3 ML: 2.5; .5 SOLUTION RESPIRATORY (INHALATION) at 03:14

## 2018-04-28 RX ADMIN — CEFTRIAXONE SODIUM 2 G: 2 INJECTION, SOLUTION INTRAVENOUS at 20:15

## 2018-04-28 RX ADMIN — FOLIC ACID 1 MG: 1 TABLET ORAL at 08:41

## 2018-04-28 RX ADMIN — HEPARIN SODIUM 5000 UNITS: 5000 INJECTION, SOLUTION INTRAVENOUS; SUBCUTANEOUS at 14:12

## 2018-04-28 RX ADMIN — IPRATROPIUM BROMIDE AND ALBUTEROL SULFATE 3 ML: 2.5; .5 SOLUTION RESPIRATORY (INHALATION) at 10:57

## 2018-04-28 RX ADMIN — CASTOR OIL AND BALSAM, PERU: 788; 87 OINTMENT TOPICAL at 20:15

## 2018-04-28 RX ADMIN — IPRATROPIUM BROMIDE AND ALBUTEROL SULFATE 3 ML: 2.5; .5 SOLUTION RESPIRATORY (INHALATION) at 07:40

## 2018-04-28 RX ADMIN — IPRATROPIUM BROMIDE AND ALBUTEROL SULFATE 3 ML: 2.5; .5 SOLUTION RESPIRATORY (INHALATION) at 17:00

## 2018-04-28 RX ADMIN — HEPARIN SODIUM 5000 UNITS: 5000 INJECTION, SOLUTION INTRAVENOUS; SUBCUTANEOUS at 20:15

## 2018-04-28 RX ADMIN — Medication 1 CAPSULE: at 08:41

## 2018-04-28 RX ADMIN — NYSTATIN: 100000 POWDER TOPICAL at 08:41

## 2018-04-28 RX ADMIN — IPRATROPIUM BROMIDE AND ALBUTEROL SULFATE 3 ML: 2.5; .5 SOLUTION RESPIRATORY (INHALATION) at 00:02

## 2018-04-28 RX ADMIN — NYSTATIN: 100000 POWDER TOPICAL at 20:16

## 2018-04-28 RX ADMIN — HEPARIN SODIUM 5000 UNITS: 5000 INJECTION, SOLUTION INTRAVENOUS; SUBCUTANEOUS at 05:15

## 2018-04-28 RX ADMIN — CASTOR OIL AND BALSAM, PERU: 788; 87 OINTMENT TOPICAL at 08:41

## 2018-04-29 PROCEDURE — 94799 UNLISTED PULMONARY SVC/PX: CPT

## 2018-04-29 PROCEDURE — 25010000002 HEPARIN (PORCINE) PER 1000 UNITS: Performed by: NURSE PRACTITIONER

## 2018-04-29 PROCEDURE — 94640 AIRWAY INHALATION TREATMENT: CPT

## 2018-04-29 PROCEDURE — 25010000003 CEFTRIAXONE PER 250 MG

## 2018-04-29 PROCEDURE — 99232 SBSQ HOSP IP/OBS MODERATE 35: CPT | Performed by: HOSPITALIST

## 2018-04-29 PROCEDURE — 94760 N-INVAS EAR/PLS OXIMETRY 1: CPT

## 2018-04-29 RX ADMIN — IPRATROPIUM BROMIDE AND ALBUTEROL SULFATE 3 ML: 2.5; .5 SOLUTION RESPIRATORY (INHALATION) at 03:29

## 2018-04-29 RX ADMIN — CASTOR OIL AND BALSAM, PERU: 788; 87 OINTMENT TOPICAL at 08:52

## 2018-04-29 RX ADMIN — CEFTRIAXONE SODIUM 2 G: 2 INJECTION, SOLUTION INTRAVENOUS at 20:33

## 2018-04-29 RX ADMIN — HEPARIN SODIUM 5000 UNITS: 5000 INJECTION, SOLUTION INTRAVENOUS; SUBCUTANEOUS at 13:27

## 2018-04-29 RX ADMIN — CASTOR OIL AND BALSAM, PERU: 788; 87 OINTMENT TOPICAL at 13:18

## 2018-04-29 RX ADMIN — NYSTATIN: 100000 POWDER TOPICAL at 08:53

## 2018-04-29 RX ADMIN — CASTOR OIL AND BALSAM, PERU: 788; 87 OINTMENT TOPICAL at 05:15

## 2018-04-29 RX ADMIN — IPRATROPIUM BROMIDE AND ALBUTEROL SULFATE 3 ML: 2.5; .5 SOLUTION RESPIRATORY (INHALATION) at 16:03

## 2018-04-29 RX ADMIN — IPRATROPIUM BROMIDE AND ALBUTEROL SULFATE 3 ML: 2.5; .5 SOLUTION RESPIRATORY (INHALATION) at 07:16

## 2018-04-29 RX ADMIN — IPRATROPIUM BROMIDE AND ALBUTEROL SULFATE 3 ML: 2.5; .5 SOLUTION RESPIRATORY (INHALATION) at 23:30

## 2018-04-29 RX ADMIN — ACETAMINOPHEN 650 MG: 325 TABLET ORAL at 13:26

## 2018-04-29 RX ADMIN — HEPARIN SODIUM 5000 UNITS: 5000 INJECTION, SOLUTION INTRAVENOUS; SUBCUTANEOUS at 20:33

## 2018-04-29 RX ADMIN — Medication 1 CAPSULE: at 08:52

## 2018-04-29 RX ADMIN — IPRATROPIUM BROMIDE AND ALBUTEROL SULFATE 3 ML: 2.5; .5 SOLUTION RESPIRATORY (INHALATION) at 19:47

## 2018-04-29 RX ADMIN — ACETAMINOPHEN 650 MG: 325 TABLET ORAL at 03:48

## 2018-04-29 RX ADMIN — CASTOR OIL AND BALSAM, PERU: 788; 87 OINTMENT TOPICAL at 20:33

## 2018-04-29 RX ADMIN — IPRATROPIUM BROMIDE AND ALBUTEROL SULFATE 3 ML: 2.5; .5 SOLUTION RESPIRATORY (INHALATION) at 12:30

## 2018-04-29 RX ADMIN — NYSTATIN: 100000 POWDER TOPICAL at 20:33

## 2018-04-29 RX ADMIN — FOLIC ACID 1 MG: 1 TABLET ORAL at 08:52

## 2018-04-29 RX ADMIN — HEPARIN SODIUM 5000 UNITS: 5000 INJECTION, SOLUTION INTRAVENOUS; SUBCUTANEOUS at 05:15

## 2018-04-30 VITALS
TEMPERATURE: 98.5 F | HEIGHT: 62 IN | HEART RATE: 67 BPM | RESPIRATION RATE: 18 BRPM | OXYGEN SATURATION: 96 % | WEIGHT: 292.77 LBS | BODY MASS INDEX: 53.88 KG/M2 | SYSTOLIC BLOOD PRESSURE: 119 MMHG | DIASTOLIC BLOOD PRESSURE: 61 MMHG

## 2018-04-30 PROCEDURE — 94799 UNLISTED PULMONARY SVC/PX: CPT

## 2018-04-30 PROCEDURE — 94640 AIRWAY INHALATION TREATMENT: CPT

## 2018-04-30 PROCEDURE — 25010000002 HEPARIN (PORCINE) PER 1000 UNITS: Performed by: NURSE PRACTITIONER

## 2018-04-30 PROCEDURE — 99239 HOSP IP/OBS DSCHRG MGMT >30: CPT | Performed by: PHYSICIAN ASSISTANT

## 2018-04-30 RX ORDER — L.ACID,PARA/B.BIFIDUM/S.THERM 8B CELL
1 CAPSULE ORAL DAILY
Qty: 14 CAPSULE | Refills: 0 | Status: SHIPPED | OUTPATIENT
Start: 2018-05-01 | End: 2018-05-14

## 2018-04-30 RX ORDER — CEPHALEXIN 500 MG/1
500 CAPSULE ORAL 4 TIMES DAILY
Qty: 28 CAPSULE | Refills: 0 | Status: SHIPPED | OUTPATIENT
Start: 2018-04-30 | End: 2018-05-07

## 2018-04-30 RX ORDER — NYSTATIN 100000 [USP'U]/G
POWDER TOPICAL
Qty: 45 G | Refills: 5 | Status: SHIPPED | OUTPATIENT
Start: 2018-04-30 | End: 2018-05-14

## 2018-04-30 RX ADMIN — IPRATROPIUM BROMIDE AND ALBUTEROL SULFATE 3 ML: 2.5; .5 SOLUTION RESPIRATORY (INHALATION) at 07:03

## 2018-04-30 RX ADMIN — NYSTATIN: 100000 POWDER TOPICAL at 08:28

## 2018-04-30 RX ADMIN — IPRATROPIUM BROMIDE AND ALBUTEROL SULFATE 3 ML: 2.5; .5 SOLUTION RESPIRATORY (INHALATION) at 11:07

## 2018-04-30 RX ADMIN — FOLIC ACID 1 MG: 1 TABLET ORAL at 08:28

## 2018-04-30 RX ADMIN — Medication 1 CAPSULE: at 08:28

## 2018-04-30 RX ADMIN — HEPARIN SODIUM 5000 UNITS: 5000 INJECTION, SOLUTION INTRAVENOUS; SUBCUTANEOUS at 06:32

## 2018-04-30 RX ADMIN — CASTOR OIL AND BALSAM, PERU: 788; 87 OINTMENT TOPICAL at 08:28

## 2018-04-30 RX ADMIN — IPRATROPIUM BROMIDE AND ALBUTEROL SULFATE 3 ML: 2.5; .5 SOLUTION RESPIRATORY (INHALATION) at 14:58

## 2018-05-01 NOTE — PAYOR COMM NOTE
"Lisa Bailey (53 y.o. Female)     Date of Birth Social Security Number Address Home Phone MRN    1965  [address]  [facility] [address] [phone] [id number]    Caodaism Marital Status          None Single       Admission Date Admission Type Admitting Provider Attending Provider Department, Room/Bed    18 Emergency Benton Arteaga MD  [facility] 5G, S546/1    Discharge Date Discharge Disposition Discharge Destination        2018 Home or Self Care              Attending Provider:  (none)   Allergies:  Penicillins    Isolation:  None   Infection:  None   Code Status:  Prior    Ht:  157.5 cm (62\")   Wt:  133 kg (292 lb 12.3 oz)    Admission Cmt:  None   Principal Problem:  Cellulitis [L03.90]                 Active Insurance as of 2018     Primary Coverage     Payor Plan Insurance Group Employer/Plan Group    [employer] [place] [employer] [place]      Payor Plan Address Payor Plan Phone Number Effective From Effective To    [address] [address] [phone] 2017     [place] [address]       Subscriber Name Subscriber Birth Date Member ID       LISA BAILEY 1965 [id number]                 Emergency Contacts      (Rel.) Home Phone Work Phone Mobile Phone    Odessa Bailey (Daughter) [phone] -- --               Discharge Summary      Crissy Rainey PA-C at 2018  2:43 PM     Attestation signed by Benton Arteaga MD at 2018  3:24 PM      Attending Deo     I supervised care of the patient on day of discharge with direct care provided by the advanced care provider (APC).    Electronically signed by Benton Arteaga MD, 18, 3:24 PM.                          [facility] Medicine Services  DISCHARGE SUMMARY    Patient Name: Lisa Bailey  : 1965  MRN: [id number]    Date of Admission: 2018  Date of Discharge:  18  Primary Care Physician: Christine Liriano, " APRN    Consults     Date and Time Order Name Status Description    4/24/2018 0454 Inpatient Infectious Diseases Consult Completed         Hospital Course     Presenting Problem:   Cellulitis of right lower extremity [L03.115]  Cellulitis of right lower extremity [L03.115]    Active Hospital Problems (** Indicates Principal Problem)    Diagnosis Date Noted   • **Cellulitis [L03.90] 04/24/2018   • Psoriatic arthritis [L40.50] 04/24/2018   • Intertriginous candidiasis [B37.2] 04/24/2018   • COPD (chronic obstructive pulmonary disease) [J44.9] 04/24/2018   • Asthma [J45.909] 04/24/2018   • Morbid obesity with BMI of 50.0-59.9, adult [E66.01, Z68.43] 04/24/2018   • HLD (hyperlipidemia) [E78.5] 04/24/2018   • HTN (hypertension) [I10] 04/24/2018   • Homelessness [Z59.0] 04/24/2018   • Cellulitis of right lower extremity [L03.115] 04/24/2018      Resolved Hospital Problems    Diagnosis Date Noted Date Resolved   No resolved problems to display.      Hospital Course:  Ms. Lisa Ledbetter is a 54yo female with PMH significant for HTN, hyperlipidemia, asthma, COPD with ongoing tobacco abuse and psoriatic arthritis. She has been homeless since August 2017 and currently resides at the [facility] in [place], [place]. She presented to [facility] ED on 4/24/18 for evaluation of cellulitis. She reports recurrent episodes of cellulitis since at least February 2018. Two inpatient hospitalizations at [facility] with IV antibiotics within the past month. Last discharged 4/20 with PO Clindamycin and Levaquin.  Her rheumatologist is managing her psoriatic arthritis with methotrexate and prednisone.     She was treated with IV Rocephin/Vancomycin and was treated for a total of 7 days prior to discharge. Infectious disease was consulted and Dr. Bardales evaluated the patient. He also recommended compression therapy with compression stockings on during the day and off at night. At discharge, she was converted to  Keflex 500mg PO 4x/day x 7 days. She is to follow up with Dr. Bardales in 2 weeks.     New PCP was arranged at discharge.   Ms. Ledbetter was noted to be hypoxic during sleep. Suspect CANDY+/-OHS. Recommend nocturnal O2 and outpatient sleep medicine evaluation. Unfortunately, the [facility] does not allow O2 tanks. Ms. Ledbetter was offered a bed at the [facility] where she could have an O2 tank. She was educated on the risks of nocturnal hypoxia and likely sleep apnea which include but are not limited to excessive daytime sleepiness, fatigue, weight gain, DMII, systemic and pulmonary arterial hypertension, heart failure and all cause mortality. Ms. Ledbetter voiced understanding of the risks of nocturnal hypoxia/CANDY and at this time, prefers to discharge to the [facility]. Ambulatory referral to sleep medicine placed at discharge.     She is improved and will d/c on 4/30/18. Social work has arranged cab voucher, clothing and transportation to first PCP appointment. Medication copays will be covered by the [employer].     Day of Discharge     HPI:   Sitting up in bed. Left leg swelling improved, RLE still painful but improved from admission. She denies chest pain, dyspnea, nausea, vomiting or diarrhea. Agreeable to return to [facility] today.     Review of Systems   Gen- No fevers, chills  CV- No chest pain, palpitations  Resp- No cough, dyspnea  GI- No N/V/D, abd pain    Otherwise ROS is negative except as mentioned in the HPI.    Vital Signs:   Temp:  [98.5 °F (36.9 °C)-98.6 °F (37 °C)] 98.5 °F (36.9 °C)  Heart Rate:  [67-72] 67  Resp:  [16-20] 18  BP: (119-120)/(56-61) 119/61     Physical Exam:  Constitutional: No acute distress, awake, alert  HENT: NCAT, mucous membranes moist  Respiratory: Clear to auscultation bilaterally, respiratory effort normal   Cardiovascular: RRR, no murmurs, rubs, or gallops, palpable pedal pulses bilaterally  Gastrointestinal: Positive bowel sounds,  soft, nontender, nondistended  Musculoskeletal: Minimal LLE edema. RLE with 1+ edema.    Psychiatric: Appropriate affect, cooperative  Neurologic: Oriented x 3, strength symmetric in all extremities, Cranial Nerves grossly intact to confrontation, speech clear  Skin: LLLE non-erythematous. RLE is tender to palpation with circumferential edema from the foot to just mid-calf     Pertinent  and/or Most Recent Results       Results from last 7 days  Lab Units 04/23/18  2214   WBC 10*3/mm3 6.56   HEMOGLOBIN g/dL 12.1   HEMATOCRIT % 39.9   PLATELETS 10*3/mm3 256   SODIUM mmol/L 139   POTASSIUM mmol/L 3.9   CHLORIDE mmol/L 106   CO2 mmol/L 29.0   BUN mg/dL 11   CREATININE mg/dL 0.80   GLUCOSE mg/dL 95   CALCIUM mg/dL 8.9       Results from last 7 days  Lab Units 04/23/18  2214   BILIRUBIN mg/dL 0.4   ALK PHOS U/L 106*   ALT (SGPT) U/L 14   AST (SGOT) U/L 15       Results from last 7 days  Lab Units 04/24/18  0537   CHOLESTEROL mg/dL 165   TRIGLYCERIDES mg/dL 174*   HDL CHOL mg/dL 27*       Results from last 7 days  Lab Units 04/24/18  0537   TSH mIU/mL 2.332   HEMOGLOBIN A1C % 5.70*   BNP pg/mL 64.0   TROPONIN I ng/mL <0.006     Brief Urine Lab Results  (Last result in the past 365 days)      Color   Clarity   Blood   Leuk Est   Nitrite   Protein   CREAT   Urine HCG        04/24/18 0455 Yellow Clear Negative Negative Negative Negative             Microbiology Results Abnormal     Procedure Component Value - Date/Time    Blood Culture - Blood, [[id number]]  (Normal) Collected:  04/23/18 2210    Lab Status:  Final result Specimen:  Blood from Arm, Right Updated:  04/28/18 2231     Blood Culture No growth at 5 days    Blood Culture - Blood, [[id number]]  (Normal) Collected:  04/23/18 2200    Lab Status:  Final result Specimen:  Blood from Arm, Left Updated:  04/28/18 2231     Blood Culture No growth at 5 days        Imaging Results (all)     None        Results for orders placed during the hospital encounter of 04/23/18    Duplex Lower Extremity Art / Grafts - Bilateral CAR    Narrative · The right common femoral artery demonstrates <50% stenosis.  · The right proximal deep femoral artery demonstrates no significant   stenosis.  · The right proximal superficial femoral artery demonstrates no   significant stenosis.  · The right mid superficial femoral artery demonstrates <50% stenosis.  · The right distal superficial femoral artery demonstrates <50% stenosis.  · The right popliteal artery demonstrates <50% stenosis.  · The right anterior tibial artery demonstrates <50% stenosis.  · The right tibial peroneal trunk artery demonstrates <50% stenosis.  · The right posterior tibial artery demonstrates no significant stenosis.  · The right peroneal stenosis artery demonstrates no significant stenosis.  · The left common femoral artery demonstrates no significant stenosis.  · The left proximal deep femoral artery demonstrates no significant   stenosis.  · The left proximal superficial femoral artery demonstrates no significant   stenosis.  · The left middle superficial femoral artery demonstrates <50% stenosis.  · The left distal superficial femoral artery demonstrates no significant   stenosis.  · The left popliteal artery demonstrates <50% stenosis.  · The left anterior tibial artery demonstrates no significant stenosis.  · The left tibial/peroneal trunk artery demonstrates no significant   stenosis.  · The left posterior tibial artery demonstrates no significant stenosis.  · The left peroneal artery demonstrates no significant stenosis.  · Overall, diffuse mild bilateral lower extremity arterial segment   atherosclerosis without marked focal stenosis demonstrated in the setting   of acceptable bilateral QUOC.        Results for orders placed during the hospital encounter of 04/23/18   Adult Transthoracic Echo Complete W/ Cont if Necessary Per Protocol    Narrative · Left atrial cavity size is mildly dilated.  · Left ventricular systolic  function is normal. Estimated EF = 60%.  · Left ventricular wall thickness is consistent with mild concentric   hypertrophy.  · WNL GLS = -22.0%.  · There is no evidence of pericardial effusion.  · No evidence of pulmonary hypertension is present.  · Normal right ventricular wall thickness, systolic function and septal   motion noted with right ventricular cavity mild-to-moderately dilated.  · Left ventricular diastolic function is normal.          Discharge Details      Lisa Ledbetter   Home Medication Instructions DAMIÁN:[id number]    Printed on:04/30/18 6574   Medication Information                      albuterol (PROVENTIL HFA;VENTOLIN HFA) 108 (90 Base) MCG/ACT inhaler  Inhale 2 puffs Every 4 (Four) Hours As Needed for Wheezing.             cephalexin (KEFLEX) 500 MG capsule  Take 1 capsule by mouth 4 (Four) Times a Day for 7 days.             folic acid (FOLVITE) 1 MG tablet  Take 1 mg by mouth Daily.             lactobacillus acidophilus (RISAQUAD) capsule capsule  Take 1 capsule by mouth Daily for 14 days.             methotrexate 2.5 MG tablet  Take 2.5 mg by mouth 1 (One) Time Per Week. 4 TABLETS ONCE A WEEK             nystatin (MYCOSTATIN) 736173 UNIT/GM powder  Apply to skin folds twice per day. Keep areas dry and clean             PREDNISONE PO  Take  by mouth. 2-5 TIMES A WEEK FOR INFLAMATION             triamcinolone (KENALOG) 0.1 % ointment  Apply to lower legs and feet twice per day               Discharge Disposition:  Home or Self Care    Discharge Diet:  Diet Instructions     Diet: Regular, Consistent Carbohydrate, Cardiac; Thin       Discharge Diet:   Regular  Consistent Carbohydrate  Cardiac       Fluid Consistency:  Thin        Discharge Activity:   Activity Instructions     Activity as Tolerated       Other Instructions (Specify)       Compression stockings on during the day, off at night.   Elevate your legs when you are able to        Future Appointments  Date Time Provider Department  [place]   5/14/2018 2:45 PM Rosa Judd MD [facility] None     Additional Instructions for the Follow-ups that You Need to Schedule     Discharge Follow-up with Specified Provider: RONI Bardales - 2 weeks    As directed      To:  RONI Bardales - 2 weeks             Time Spent on Discharge:  45 minutes    Electronically signed by Crissy Rainey PA-C, 04/30/18, 3:06 PM.        Electronically signed by Benton Arteaga MD at 4/30/2018  3:24 PM

## 2018-05-14 ENCOUNTER — OFFICE VISIT (OUTPATIENT)
Dept: INTERNAL MEDICINE | Facility: CLINIC | Age: 53
End: 2018-05-14

## 2018-05-14 VITALS
SYSTOLIC BLOOD PRESSURE: 140 MMHG | RESPIRATION RATE: 20 BRPM | HEIGHT: 62 IN | DIASTOLIC BLOOD PRESSURE: 106 MMHG | WEIGHT: 281 LBS | BODY MASS INDEX: 51.71 KG/M2

## 2018-05-14 DIAGNOSIS — L03.115 CELLULITIS OF RIGHT LOWER EXTREMITY: Primary | ICD-10-CM

## 2018-05-14 DIAGNOSIS — I10 ESSENTIAL HYPERTENSION: Chronic | ICD-10-CM

## 2018-05-14 PROBLEM — R73.03 PREDIABETES: Status: ACTIVE | Noted: 2018-05-14

## 2018-05-14 PROCEDURE — 99204 OFFICE O/P NEW MOD 45 MIN: CPT | Performed by: FAMILY MEDICINE

## 2018-05-14 RX ORDER — HYDROCHLOROTHIAZIDE 12.5 MG/1
12.5 CAPSULE, GELATIN COATED ORAL DAILY
Qty: 30 CAPSULE | Refills: 0 | Status: SHIPPED | OUTPATIENT
Start: 2018-05-14 | End: 2019-04-25

## 2018-05-14 NOTE — PATIENT INSTRUCTIONS
It was nice meeting you today!  I look forward to getting to know you and helping you with your primary care needs.  I have ordered a referral for you to follow-up with Dr. Bardales.  You will be called to set up an appointment with this specialist.  If able to tolerate, recommend that you continue wearing compression stockings to help with your leg swelling.  I have prescribed a blood pressure medication for you called Hydrochlorothiazide.  Your new medication has been electronically prescribed and will be at your pharmacy.  Please pick this up and take as directed.  Recommend that you sign up for My Chart (our patient portal).  You will be able to access lab results, request appointments and send our office messages.  If you are interested, please ask for My Chart access information at the check out desk.  Please schedule an appointment for your annual physical exam (if you have not already had one for this year) at your earliest convenience.  Please follow-up as indicated.  Return to the clinic sooner if your symptoms worsen or if you have any other concerns.

## 2018-05-14 NOTE — PROGRESS NOTES
Subjective   Lisa Ledbetter is a 53 y.o. female who presents to the clinic to establish care and for hospital follow-up for Cellulitis      History of Present Illness  She reports that her previous PCP was SHELLY Crockett in [place], [place].  Transferred care due to relocating to [place].    Specialists include: Dr. Ry Goel (rheumatologist in [place])  Prescription medications include: Methotrexate, Albuterol, Folic Acid, Prednisone  OTC medications include: None    Patient presents to follow-up after recent hospital stay at [facility] from 04/23/18 until 04/30/18 and recurrent right leg cellulitis.  Had previously been hospitalized at [facility] twice within the past one month.  ID was consulted and patient was treated with IV Rocephin and Vancomycin during this hospital stay.  Was treated with IV antibiotics for a total of 7 days then started on a 7 day course of Keflex at discharge.  Was also advised to use compression stockings during the day to help with her swelling.  Plan is for patient to follow-up with ID as an outpatient.    Since discharge, patient reports overall improving symptoms.  States that she took her antibiotic as directed and denies any intolerable side effects.  States that she started using compression stockings for the first few days after discharge, but was not able to tolerate them beyond a few days.  Reports that she went to see her ID specialist on 05/07/18, but was told that she did not have an appointment.  Denies worsening leg redness, fevers, sweats, chills.    Review of Systems   Constitutional: Positive for fatigue. Negative for chills and fever.   HENT: Negative for congestion, ear pain, rhinorrhea, sinus pressure and sore throat.    Eyes: Negative for pain and visual disturbance.   Respiratory: Negative for cough and shortness of breath.    Cardiovascular: Positive for leg swelling. Negative for chest pain and palpitations.   Gastrointestinal: Negative for abdominal  pain, constipation, diarrhea, nausea and vomiting.   Genitourinary: Negative for decreased urine volume, dysuria and hematuria.   Musculoskeletal: Positive for arthralgias (chronic) and joint swelling (chronic). Negative for back pain and gait problem.   Skin: Negative for pallor.        Positive for right lower leg cellulitis, improving.   Neurological: Negative for dizziness, syncope and headaches.     All other systems reviewed and are negative.    Past Medical History:   Diagnosis Date   • Anemia    • Asthma    • Cellulitis 4/24/2018   • Chronic bronchitis    • COPD (chronic obstructive pulmonary disease)    • Depression    • History of stroke     told a possible mini stroke or mild stroke ~2004 chris   • HLD (hyperlipidemia)    • Homelessness 4/24/2018   • Intertriginous candidiasis 4/24/2018   • Morbid obesity with BMI of 50.0-59.9, adult 4/24/2018   • Psoriatic arthritis 4/24/2018       Family History   Problem Relation Age of Onset   • Diabetes Mother    • Osteoporosis Mother    • Heart failure Father    • Heart attack Father    • Cancer Father      Bladder   • Stroke Father    • Anxiety disorder Daughter    • Depression Daughter    • Obesity Daughter    • Lung cancer Brother    • Heart attack Paternal Uncle    • Heart disease Paternal Uncle    • Heart attack Brother    • Heart failure Brother    • Schizophrenia Brother    • Heart attack Brother    • Heart disease Brother    • Schizophrenia Brother        Past Surgical History:   Procedure Laterality Date   • CHOLECYSTECTOMY      age 20s   • GASTRIC BYPASS      age 20s, staples   • TENDON REPAIR      RIGHT 3rd FINGER, age 18       Social History     Social History   • Marital status: Single     Spouse name: N/A   • Number of children: 1   • Years of education: N/A     Occupational History   • Not on file.     Social History Main Topics   • Smoking status: Current Every Day Smoker     Packs/day: 1.00     Types: Cigarettes   • Smokeless tobacco: Never Used       "Comment: Onset age 15   • Alcohol use Yes      Comment: occasionally   • Drug use: No      Comment: occ experimenting when young only, never hx IV   • Sexual activity: Defer     Other Topics Concern   • Not on file     Social History Narrative    Lisa Ledbetter is a 53 year old white female. She has one daughter. She became homeless 8/2017 and trying to appeal for disability. She does not have an advanced directive.         Current Outpatient Prescriptions:   •  albuterol (PROVENTIL HFA;VENTOLIN HFA) 108 (90 Base) MCG/ACT inhaler, Inhale 2 puffs Every 4 (Four) Hours As Needed for Wheezing., Disp: , Rfl:   •  folic acid (FOLVITE) 1 MG tablet, Take 1 mg by mouth Daily., Disp: , Rfl:   •  methotrexate 2.5 MG tablet, Take 2.5 mg by mouth 1 (One) Time Per Week. 4 TABLETS ONCE A WEEK, Disp: , Rfl:   •  PREDNISONE PO, Take  by mouth. 2-5 TIMES A WEEK FOR INFLAMATION, Disp: , Rfl:   •  hydrochlorothiazide (MICROZIDE) 12.5 MG capsule, Take 1 capsule by mouth Daily., Disp: 30 capsule, Rfl: 0    Current outpatient and discharge medications have been reconciled for the patient.  Reviewed by: Rosa Judd MD    Objective   BP (!) 140/106   Resp 20   Ht 157.5 cm (62\")   Wt 127 kg (281 lb)   BMI 51.40 kg/m²      Physical Exam   Constitutional: She is oriented to person, place, and time. She appears well-developed and well-nourished.   No acute distress. Obese.   HENT:   Head: Normocephalic and atraumatic.   Right Ear: External ear normal.   Left Ear: External ear normal.   Nose: Nose normal.   Eyes: Conjunctivae and EOM are normal.   Neck: Normal range of motion. Neck supple.   Cardiovascular: Normal rate, regular rhythm, normal heart sounds and intact distal pulses.    Lower extremity swelling noted, right greater than left.   Pulmonary/Chest: Effort normal and breath sounds normal. No respiratory distress. She has no wheezes.   Abdominal: Soft. Bowel sounds are normal. There is no tenderness.   Musculoskeletal: Normal " range of motion.   Neurological: She is alert and oriented to person, place, and time.   Skin: Skin is warm and dry.   Mild erythema noted in lower right leg, improving per patient report.   Psychiatric: She has a normal mood and affect. Her behavior is normal.   Vitals reviewed.      Assessment/Plan   Lisa was seen today for cellulitis.    Diagnoses and all orders for this visit:    Cellulitis of right lower extremity  -     Ambulatory Referral to Infectious Disease    Essential hypertension  -     hydrochlorothiazide (MICROZIDE) 12.5 MG capsule; Take 1 capsule by mouth Daily.    Hospital records were reviewed today.  Clinically improving symptoms per patient report.  Will order a referral for infectious disease today to aid with further evaluation and management.  Blood pressure today in office was 140/106.  Patient denies any concerning symptoms at this time.  Will prescribe the above medication today.  Advised patient to return to the clinic in 4 weeks for blood pressure recheck or sooner if their blood pressure worsens.

## 2018-06-28 ENCOUNTER — OFFICE VISIT (OUTPATIENT)
Dept: INTERNAL MEDICINE | Facility: CLINIC | Age: 53
End: 2018-06-28

## 2018-06-28 VITALS
DIASTOLIC BLOOD PRESSURE: 90 MMHG | WEIGHT: 285 LBS | BODY MASS INDEX: 52.44 KG/M2 | RESPIRATION RATE: 22 BRPM | SYSTOLIC BLOOD PRESSURE: 136 MMHG | HEIGHT: 62 IN

## 2018-06-28 DIAGNOSIS — J44.9 CHRONIC OBSTRUCTIVE PULMONARY DISEASE, UNSPECIFIED COPD TYPE (HCC): Chronic | ICD-10-CM

## 2018-06-28 DIAGNOSIS — J45.901 ACUTE EXACERBATION OF COPD WITH ASTHMA (HCC): Primary | ICD-10-CM

## 2018-06-28 DIAGNOSIS — I10 ESSENTIAL HYPERTENSION: Chronic | ICD-10-CM

## 2018-06-28 DIAGNOSIS — J44.1 ACUTE EXACERBATION OF COPD WITH ASTHMA (HCC): Primary | ICD-10-CM

## 2018-06-28 PROCEDURE — 99214 OFFICE O/P EST MOD 30 MIN: CPT | Performed by: FAMILY MEDICINE

## 2018-06-28 RX ORDER — DOXYCYCLINE 100 MG/1
100 TABLET ORAL 2 TIMES DAILY
Qty: 14 TABLET | Refills: 0 | Status: SHIPPED | OUTPATIENT
Start: 2018-06-28 | End: 2018-07-05

## 2018-06-28 RX ORDER — ALBUTEROL SULFATE 90 UG/1
2 AEROSOL, METERED RESPIRATORY (INHALATION) EVERY 6 HOURS PRN
Qty: 1 INHALER | Refills: 2 | Status: SHIPPED | OUTPATIENT
Start: 2018-06-28 | End: 2019-07-18 | Stop reason: SDUPTHER

## 2018-06-28 RX ORDER — PREDNISONE 20 MG/1
40 TABLET ORAL DAILY
Qty: 10 TABLET | Refills: 0 | Status: SHIPPED | OUTPATIENT
Start: 2018-06-28 | End: 2018-07-12
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
## 2018-06-28 NOTE — PATIENT INSTRUCTIONS
I have refilled your Albuterol inhaler as well as prescribed Anoro (new inhaler), Doxycycline (antibiotic) and Prednisone (5 day steroid course).  Your medications have been electronically prescribed and will be at your pharmacy.  Please pick them up and take as directed.  You will use your Anoro inhaler as follows: one puff once a day.  Please work on quitting smoking to also help with your breathing.  Please follow-up as indicated.  Return to the clinic sooner if your symptoms worsen or if you have any other concerns.

## 2018-07-01 ENCOUNTER — APPOINTMENT (OUTPATIENT)
Dept: GENERAL RADIOLOGY | Facility: HOSPITAL | Age: 53
End: 2018-07-01

## 2018-07-01 ENCOUNTER — HOSPITAL ENCOUNTER (EMERGENCY)
Facility: HOSPITAL | Age: 53
Discharge: HOME | End: 2018-07-01
Attending: EMERGENCY MEDICINE | Admitting: EMERGENCY MEDICINE

## 2018-07-01 VITALS
HEART RATE: 66 BPM | BODY MASS INDEX: 48.83 KG/M2 | SYSTOLIC BLOOD PRESSURE: 123 MMHG | WEIGHT: 286 LBS | TEMPERATURE: 97.9 F | OXYGEN SATURATION: 90 % | RESPIRATION RATE: 18 BRPM | DIASTOLIC BLOOD PRESSURE: 72 MMHG | HEIGHT: 64 IN

## 2018-07-01 DIAGNOSIS — J44.1 ACUTE EXACERBATION OF CHRONIC OBSTRUCTIVE PULMONARY DISEASE (COPD) (HCC): Primary | ICD-10-CM

## 2018-07-01 DIAGNOSIS — R06.02 SHORTNESS OF BREATH: ICD-10-CM

## 2018-07-01 LAB
ALBUMIN SERPL-MCNC: 4.02 G/DL (ref 3.2–4.8)
ALBUMIN/GLOB SERPL: 1 G/DL (ref 1.5–2.5)
ALP SERPL-CCNC: 97 U/L (ref 25–100)
ALT SERPL W P-5'-P-CCNC: 16 U/L (ref 7–40)
ANION GAP SERPL CALCULATED.3IONS-SCNC: 5 MMOL/L (ref 3–11)
AST SERPL-CCNC: 18 U/L (ref 0–33)
BASOPHILS # BLD AUTO: 0.01 10*3/MM3 (ref 0–0.2)
BASOPHILS NFR BLD AUTO: 0.2 % (ref 0–1)
BILIRUB SERPL-MCNC: 0.4 MG/DL (ref 0.3–1.2)
BNP SERPL-MCNC: 141 PG/ML (ref 0–100)
BUN BLD-MCNC: 17 MG/DL (ref 9–23)
BUN/CREAT SERPL: 21.5 (ref 7–25)
CALCIUM SPEC-SCNC: 9.5 MG/DL (ref 8.7–10.4)
CHLORIDE SERPL-SCNC: 110 MMOL/L (ref 99–109)
CO2 SERPL-SCNC: 27 MMOL/L (ref 20–31)
CREAT BLD-MCNC: 0.79 MG/DL (ref 0.6–1.3)
DEPRECATED RDW RBC AUTO: 48.6 FL (ref 37–54)
EOSINOPHIL # BLD AUTO: 0.01 10*3/MM3 (ref 0–0.3)
EOSINOPHIL NFR BLD AUTO: 0.2 % (ref 0–3)
ERYTHROCYTE [DISTWIDTH] IN BLOOD BY AUTOMATED COUNT: 15.3 % (ref 11.3–14.5)
GFR SERPL CREATININE-BSD FRML MDRD: 77 ML/MIN/1.73
GLOBULIN UR ELPH-MCNC: 3.9 GM/DL
GLUCOSE BLD-MCNC: 156 MG/DL (ref 70–100)
HCT VFR BLD AUTO: 40.7 % (ref 34.5–44)
HGB BLD-MCNC: 12.5 G/DL (ref 11.5–15.5)
HOLD SPECIMEN: NORMAL
HOLD SPECIMEN: NORMAL
IMM GRANULOCYTES # BLD: 0.02 10*3/MM3 (ref 0–0.03)
IMM GRANULOCYTES NFR BLD: 0.4 % (ref 0–0.6)
LYMPHOCYTES # BLD AUTO: 0.75 10*3/MM3 (ref 0.6–4.8)
LYMPHOCYTES NFR BLD AUTO: 13.7 % (ref 24–44)
MCH RBC QN AUTO: 26.5 PG (ref 27–31)
MCHC RBC AUTO-ENTMCNC: 30.7 G/DL (ref 32–36)
MCV RBC AUTO: 86.4 FL (ref 80–99)
MONOCYTES # BLD AUTO: 0.08 10*3/MM3 (ref 0–1)
MONOCYTES NFR BLD AUTO: 1.5 % (ref 0–12)
NEUTROPHILS # BLD AUTO: 4.59 10*3/MM3 (ref 1.5–8.3)
NEUTROPHILS NFR BLD AUTO: 84 % (ref 41–71)
PLATELET # BLD AUTO: 209 10*3/MM3 (ref 150–450)
PMV BLD AUTO: 9.6 FL (ref 6–12)
POTASSIUM BLD-SCNC: 4.4 MMOL/L (ref 3.5–5.5)
PROT SERPL-MCNC: 7.9 G/DL (ref 5.7–8.2)
RBC # BLD AUTO: 4.71 10*6/MM3 (ref 3.89–5.14)
SODIUM BLD-SCNC: 142 MMOL/L (ref 132–146)
TROPONIN I SERPL-MCNC: 0 NG/ML (ref 0–0.07)
TROPONIN I SERPL-MCNC: 0 NG/ML (ref 0–0.07)
WBC NRBC COR # BLD: 5.46 10*3/MM3 (ref 3.5–10.8)
WHOLE BLOOD HOLD SPECIMEN: NORMAL
WHOLE BLOOD HOLD SPECIMEN: NORMAL

## 2018-07-01 PROCEDURE — 71045 X-RAY EXAM CHEST 1 VIEW: CPT

## 2018-07-01 PROCEDURE — 84484 ASSAY OF TROPONIN QUANT: CPT

## 2018-07-01 PROCEDURE — 94640 AIRWAY INHALATION TREATMENT: CPT

## 2018-07-01 PROCEDURE — 25010000002 MAGNESIUM SULFATE IN D5W 1G/100ML (PREMIX) 1-5 GM/100ML-% SOLUTION: Performed by: EMERGENCY MEDICINE

## 2018-07-01 PROCEDURE — 93005 ELECTROCARDIOGRAM TRACING: CPT | Performed by: EMERGENCY MEDICINE

## 2018-07-01 PROCEDURE — 85025 COMPLETE CBC W/AUTO DIFF WBC: CPT | Performed by: EMERGENCY MEDICINE

## 2018-07-01 PROCEDURE — 80053 COMPREHEN METABOLIC PANEL: CPT | Performed by: EMERGENCY MEDICINE

## 2018-07-01 PROCEDURE — 25010000002 DEXAMETHASONE PER 1 MG: Performed by: EMERGENCY MEDICINE

## 2018-07-01 PROCEDURE — 99284 EMERGENCY DEPT VISIT MOD MDM: CPT

## 2018-07-01 PROCEDURE — 96365 THER/PROPH/DIAG IV INF INIT: CPT

## 2018-07-01 PROCEDURE — 96375 TX/PRO/DX INJ NEW DRUG ADDON: CPT

## 2018-07-01 PROCEDURE — 83880 ASSAY OF NATRIURETIC PEPTIDE: CPT | Performed by: EMERGENCY MEDICINE

## 2018-07-01 RX ORDER — SODIUM CHLORIDE 0.9 % (FLUSH) 0.9 %
10 SYRINGE (ML) INJECTION AS NEEDED
Status: DISCONTINUED | OUTPATIENT
Start: 2018-07-01 | End: 2018-07-01 | Stop reason: HOSPADM

## 2018-07-01 RX ORDER — DEXAMETHASONE SODIUM PHOSPHATE 4 MG/ML
4 INJECTION, SOLUTION INTRA-ARTICULAR; INTRALESIONAL; INTRAMUSCULAR; INTRAVENOUS; SOFT TISSUE ONCE
Status: COMPLETED | OUTPATIENT
Start: 2018-07-01 | End: 2018-07-01

## 2018-07-01 RX ORDER — IPRATROPIUM BROMIDE AND ALBUTEROL SULFATE 2.5; .5 MG/3ML; MG/3ML
3 SOLUTION RESPIRATORY (INHALATION) ONCE
Status: COMPLETED | OUTPATIENT
Start: 2018-07-01 | End: 2018-07-01

## 2018-07-01 RX ORDER — MAGNESIUM SULFATE 1 G/100ML
1 INJECTION INTRAVENOUS ONCE
Status: COMPLETED | OUTPATIENT
Start: 2018-07-01 | End: 2018-07-01

## 2018-07-01 RX ADMIN — DEXAMETHASONE SODIUM PHOSPHATE 4 MG: 4 INJECTION, SOLUTION INTRAMUSCULAR; INTRAVENOUS at 16:06

## 2018-07-01 RX ADMIN — MAGNESIUM SULFATE HEPTAHYDRATE 1 G: 1 INJECTION, SOLUTION INTRAVENOUS at 16:07

## 2018-07-01 RX ADMIN — IPRATROPIUM BROMIDE AND ALBUTEROL SULFATE 3 ML: 2.5; .5 SOLUTION RESPIRATORY (INHALATION) at 15:50

## 2018-07-01 NOTE — ED PROVIDER NOTES
Subjective   Lisa Ledbetter is a 53 y.o.female who presents to the ED with complaints of shortness of breath. She reports that she began to have a dry cough 4 days ago. Secondary to coughing, she begins to be short of breath. She went to see her PCP 3 days ago and was told that her lungs sounded tight. She was prescribed Prednisone , Albuterol, Norel, Ellipta, and Doxycycline. Her shortness of breath has not subsided which has prompted her arrival to the ED. She is experiencing chest tightness with breathing. The patient endorses that she has felt febrile and has had stomach cramping, chills, wheezing, and arm aches, but she denies having any chest pain. The patient has a medical history significant for COPD and asthma. She has been a smoker for 38 years and smokes half a pack of cigarettes a day currently. She does not have any history of kidney failure, CVA, or MI.         History provided by:  Patient  Shortness of Breath   Severity:  Moderate  Onset quality:  Sudden  Duration:  4 days  Timing:  Constant  Progression:  Unchanged  Chronicity:  New  Relieved by:  Nothing  Worsened by:  Coughing  Ineffective treatments:  Inhaler (antibiotics, steroids. )  Associated symptoms: abdominal pain (cramping), cough, fever and wheezing    Associated symptoms: no chest pain and no sputum production    Risk factors: obesity and tobacco use        Review of Systems   Constitutional: Positive for chills and fever.   Respiratory: Positive for cough, shortness of breath and wheezing. Negative for sputum production.    Cardiovascular: Negative for chest pain.   Gastrointestinal: Positive for abdominal pain (cramping).   Musculoskeletal:        Arm aches.    All other systems reviewed and are negative.      Past Medical History:   Diagnosis Date   • Anemia    • Asthma    • Cellulitis 4/24/2018   • Chronic bronchitis    • COPD (chronic obstructive pulmonary disease)    • Depression    • History of stroke     told a possible mini  stroke or mild stroke ~2004 [facility]ris   • HLD (hyperlipidemia)    • Homelessness 4/24/2018   • Intertriginous candidiasis 4/24/2018   • Morbid obesity with BMI of 50.0-59.9, adult 4/24/2018   • Psoriatic arthritis 4/24/2018       Allergies   Allergen Reactions   • Penicillins Rash       Past Surgical History:   Procedure Laterality Date   • CHOLECYSTECTOMY      age 20s   • GASTRIC BYPASS      age 20s, staples   • TENDON REPAIR      RIGHT 3rd FINGER, age 18       Family History   Problem Relation Age of Onset   • Diabetes Mother    • Osteoporosis Mother    • Heart failure Father    • Heart attack Father    • Cancer Father         Bladder   • Stroke Father    • Anxiety disorder Daughter    • Depression Daughter    • Obesity Daughter    • Lung cancer Brother    • Heart attack Paternal Uncle    • Heart disease Paternal Uncle    • Heart attack Brother    • Heart failure Brother    • Schizophrenia Brother    • Heart attack Brother    • Heart disease Brother    • Schizophrenia Brother        Social History     Social History   • Marital status: Single   • Number of children: 1     Social History Main Topics   • Smoking status: Current Every Day Smoker     Packs/day: 1.00     Types: Cigarettes   • Smokeless tobacco: Never Used      Comment: Onset age 15   • Alcohol use Yes      Comment: occasionally   • Drug use: No      Comment: occ experimenting when young only, never hx IV   • Sexual activity: Defer     Other Topics Concern   • Not on file     Social History Narrative    Lisa Ledbetter is a 53 year old white female. She has one daughter. She became homeless 8/2017 and trying to appeal for disability. She does not have an advanced directive.         Objective   Physical Exam   Constitutional: She is oriented to person, place, and time. She appears well-developed and well-nourished. No distress.   Obese female.      HENT:   Head: Normocephalic and atraumatic.   Nose: Nose normal.   Eyes: Conjunctivae are normal. No scleral  icterus.   Neck: Normal range of motion. Neck supple.   Cardiovascular: Normal rate, regular rhythm, normal heart sounds and intact distal pulses.    No murmur heard.  Pulmonary/Chest: She is in respiratory distress (mild). She has wheezes in the right upper field, the right middle field, the right lower field, the left upper field, the left middle field and the left lower field. She has rhonchi (Diffuse).   Bilateral wheezing upon expiration. Diffuse rhonchi. Extended respiratory phase. Increased work of breathing. Mild respiratory distress.    Abdominal: Soft. Bowel sounds are normal. There is no tenderness.   Musculoskeletal: Normal range of motion. She exhibits no edema.   Neurological: She is alert and oriented to person, place, and time.   Skin: Skin is warm and dry.   Psychiatric: She has a normal mood and affect. Her behavior is normal.   Nursing note and vitals reviewed.      Procedures         ED Course  ED Course as of Jul 01 1849   Sun Jul 01, 2018   1623 Troponin I: 0.00 [RS]   1735 Patient tolerated ambulation without desaturation and without significant distress.  We'll discharge the patient home to follow-up with her doctor as soon as possible.  [RS]      ED Course User Index  [RS] Darrell Keene MD      Recent Results (from the past 24 hour(s))   Comprehensive Metabolic Panel    Collection Time: 07/01/18  3:59 PM   Result Value Ref Range    Glucose 156 (H) 70 - 100 mg/dL    BUN 17 9 - 23 mg/dL    Creatinine 0.79 0.60 - 1.30 mg/dL    Sodium 142 132 - 146 mmol/L    Potassium 4.4 3.5 - 5.5 mmol/L    Chloride 110 (H) 99 - 109 mmol/L    CO2 27.0 20.0 - 31.0 mmol/L    Calcium 9.5 8.7 - 10.4 mg/dL    Total Protein 7.9 5.7 - 8.2 g/dL    Albumin 4.02 3.20 - 4.80 g/dL    ALT (SGPT) 16 7 - 40 U/L    AST (SGOT) 18 0 - 33 U/L    Alkaline Phosphatase 97 25 - 100 U/L    Total Bilirubin 0.4 0.3 - 1.2 mg/dL    eGFR Non African Amer 77 >60 mL/min/1.73    Globulin 3.9 gm/dL    A/G Ratio 1.0 (L) 1.5 - 2.5 g/dL     BUN/Creatinine Ratio 21.5 7.0 - 25.0    Anion Gap 5.0 3.0 - 11.0 mmol/L   BNP    Collection Time: 07/01/18  3:59 PM   Result Value Ref Range    .0 (H) 0.0 - 100.0 pg/mL   Light Blue Top    Collection Time: 07/01/18  3:59 PM   Result Value Ref Range    Extra Tube hold for add-on    Green Top (Gel)    Collection Time: 07/01/18  3:59 PM   Result Value Ref Range    Extra Tube Hold for add-ons.    Lavender Top    Collection Time: 07/01/18  3:59 PM   Result Value Ref Range    Extra Tube hold for add-on    Gold Top - SST    Collection Time: 07/01/18  3:59 PM   Result Value Ref Range    Extra Tube Hold for add-ons.    CBC Auto Differential    Collection Time: 07/01/18  3:59 PM   Result Value Ref Range    WBC 5.46 3.50 - 10.80 10*3/mm3    RBC 4.71 3.89 - 5.14 10*6/mm3    Hemoglobin 12.5 11.5 - 15.5 g/dL    Hematocrit 40.7 34.5 - 44.0 %    MCV 86.4 80.0 - 99.0 fL    MCH 26.5 (L) 27.0 - 31.0 pg    MCHC 30.7 (L) 32.0 - 36.0 g/dL    RDW 15.3 (H) 11.3 - 14.5 %    RDW-SD 48.6 37.0 - 54.0 fl    MPV 9.6 6.0 - 12.0 fL    Platelets 209 150 - 450 10*3/mm3    Neutrophil % 84.0 (H) 41.0 - 71.0 %    Lymphocyte % 13.7 (L) 24.0 - 44.0 %    Monocyte % 1.5 0.0 - 12.0 %    Eosinophil % 0.2 0.0 - 3.0 %    Basophil % 0.2 0.0 - 1.0 %    Immature Grans % 0.4 0.0 - 0.6 %    Neutrophils, Absolute 4.59 1.50 - 8.30 10*3/mm3    Lymphocytes, Absolute 0.75 0.60 - 4.80 10*3/mm3    Monocytes, Absolute 0.08 0.00 - 1.00 10*3/mm3    Eosinophils, Absolute 0.01 0.00 - 0.30 10*3/mm3    Basophils, Absolute 0.01 0.00 - 0.20 10*3/mm3    Immature Grans, Absolute 0.02 0.00 - 0.03 10*3/mm3   POC Troponin, Rapid    Collection Time: 07/01/18  4:02 PM   Result Value Ref Range    Troponin I 0.00 0.00 - 0.07 ng/mL   POC Troponin, Rapid    Collection Time: 07/01/18  6:09 PM   Result Value Ref Range    Troponin I 0.00 0.00 - 0.07 ng/mL     Note: In addition to lab results from this visit, the labs listed above may include labs taken at another facility or during a  different encounter within the last 24 hours. Please correlate lab times with ED admission and discharge times for further clarification of the services performed during this visit.    XR Chest 1 View   Final Result   Borderline cardiomegaly with increased central pulmonary   vascularity as well as bibasilar atelectasis however no significant   overt edema or consolidation and no effusion.       DICTATED:   7/01/2018   EDITED/ls :   7/01/2018        This report was finalized on 7/1/2018 6:03 PM by Dr. Gilberto Vizcaino.            Vitals:    07/01/18 1700 07/01/18 1714 07/01/18 1715 07/01/18 1745   BP: 117/70  127/74 123/72   Pulse:  66     Resp:       Temp:       TempSrc:       SpO2:  94%  90%   Weight:       Height:         Medications   sodium chloride 0.9 % flush 10 mL (not administered)   ipratropium-albuterol (DUO-NEB) nebulizer solution 3 mL (3 mL Nebulization Given 7/1/18 1550)   dexamethasone (DECADRON) injection 4 mg (4 mg Intravenous Given 7/1/18 1606)   magnesium sulfate in D5W 1g/100mL (PREMIX) (0 g Intravenous Stopped 7/1/18 1721)     ECG/EMG Results (last 24 hours)     Procedure Component Value Units Date/Time    ECG 12 Lead [[id number]] Collected:  07/01/18 1543     Updated:  07/01/18 1544                          MDM  Number of Diagnoses or Management Options  Acute exacerbation of chronic obstructive pulmonary disease (COPD):   Shortness of breath:   Diagnosis management comments: ECG/EMG Results (last 24 hours)     Procedure Component Value Units Date/Time    ECG 12 Lead ([id number]) Collected:  07/01/18 1543     Updated:  07/01/18 1605    Narrative:       Test Reason : SOA Protocol  Blood Pressure : **/** mmHG  Vent. Rate : 068 BPM     Atrial Rate : 068 BPM     P-R Int : 146 ms          QRS Dur : 090 ms      QT Int : 408 ms       P-R-T Axes : 080 049 044 degrees     QTc Int : 433 ms    Normal sinus rhythm  No previous ECGs available  Confirmed by TORI DANIELSON MD ([id number]) on 7/1/2018 4:05:40  PM    Referred By:  ED MD           Confirmed By:DARRELL KEENE MD    ECG 12 Lead ([id number]) Collected:  07/01/18 1805     Updated:  07/01/18 1807             Amount and/or Complexity of Data Reviewed  Clinical lab tests: reviewed  Tests in the radiology section of CPT®: reviewed  Decide to obtain previous medical records or to obtain history from someone other than the patient: yes  Obtain history from someone other than the patient: yes  Review and summarize past medical records: yes  Independent visualization of images, tracings, or specimens: yes        Final diagnoses:   Acute exacerbation of chronic obstructive pulmonary disease (COPD)   Shortness of breath       Documentation assistance provided by renate Wise.  Information recorded by the scribe was done at my direction and has been verified and validated by me.     Jeremy Wise  07/01/18 1612       Darrell Keene MD  07/01/18 3927

## 2018-07-12 ENCOUNTER — OFFICE VISIT (OUTPATIENT)
Dept: INTERNAL MEDICINE | Facility: CLINIC | Age: 53
End: 2018-07-12

## 2018-07-12 VITALS
RESPIRATION RATE: 18 BRPM | HEIGHT: 64 IN | WEIGHT: 281 LBS | BODY MASS INDEX: 47.97 KG/M2 | DIASTOLIC BLOOD PRESSURE: 90 MMHG | SYSTOLIC BLOOD PRESSURE: 140 MMHG

## 2018-07-12 DIAGNOSIS — J44.9 CHRONIC OBSTRUCTIVE PULMONARY DISEASE, UNSPECIFIED COPD TYPE (HCC): Primary | Chronic | ICD-10-CM

## 2018-07-12 PROCEDURE — 99213 OFFICE O/P EST LOW 20 MIN: CPT | Performed by: FAMILY MEDICINE

## 2018-07-12 NOTE — PATIENT INSTRUCTIONS
I have ordered a lung function test for you.  You will be called to set up an appointment for this test.  Recommend that you stop using Anoro and start using Breo (1 puff inhaled once a day).  We will call you to schedule your sleep study.  Please work on quitting smoking.  Please follow-up as indicated.  Return to the clinic sooner if your symptoms worsen or if you have any other concerns.

## 2018-07-12 NOTE — PROGRESS NOTES
"Subjective   Lisa Ledbetter is a 53 y.o. female who presents to follow-up for COPD      History of Present Illness   Patient presents to follow-up for chronic COPD management.  Was treated for an acute COPD exacerbation at her last office visit and states that she took her medications as directed.  Reports that her symptoms continued to worsen so she went to the ED and was treated with nebulizer treatments.  States that her symptoms have been improving since her ED visit.  Has been using her Anoro inhaler daily and Albuterol inhaler a couple of times per day every day.  Continues to smoke.    Review of Systems   Constitutional: Negative for chills and fever.   Respiratory: Positive for cough (productive, clear sputum, improved), shortness of breath (improved) and wheezing (improved).    Cardiovascular: Negative for chest pain and palpitations.   Gastrointestinal: Negative for abdominal pain, constipation, diarrhea, nausea and vomiting.   Neurological: Negative for dizziness, syncope and headaches.     The following portions of the patient's history were reviewed and updated as appropriate: current medications, past medical history, past social history and problem list.    Objective   /90   Resp 18   Ht 162.6 cm (64\")   Wt 127 kg (281 lb)   BMI 48.23 kg/m²      Physical Exam   Constitutional: She is oriented to person, place, and time. She appears well-developed and well-nourished.   No acute distress.   HENT:   Head: Normocephalic and atraumatic.   Eyes: Conjunctivae and EOM are normal.   Neck: Normal range of motion.   Cardiovascular: Normal rate, regular rhythm, normal heart sounds and intact distal pulses.    Pulmonary/Chest: Effort normal. No respiratory distress. She has wheezes in the right upper field and the left upper field.   Musculoskeletal: Normal range of motion.   Moves all extremities.   Neurological: She is alert and oriented to person, place, and time.   Skin: Skin is warm and dry. "   Psychiatric: She has a normal mood and affect. Her behavior is normal.   Vitals reviewed.      Assessment/Plan   Lisa was seen today for copd.    Diagnoses and all orders for this visit:    Chronic obstructive pulmonary disease, unspecified COPD type (CMS/HCC)  -     Spirometry With Bronchodilator    Will stop Anoro and start patient on Breo today.  Instructed patient on how to use her new medication and provided her with 4 weeks of samples.  Will also order spirometry for patient today.  Advised patient to return to the clinic in 4 weeks for recheck or sooner if their symptoms worsen.

## 2018-08-02 ENCOUNTER — OFFICE VISIT (OUTPATIENT)
Dept: PULMONOLOGY | Facility: CLINIC | Age: 53
End: 2018-08-02

## 2018-08-02 DIAGNOSIS — R06.02 SOB (SHORTNESS OF BREATH): Primary | ICD-10-CM

## 2018-08-02 PROCEDURE — 94726 PLETHYSMOGRAPHY LUNG VOLUMES: CPT | Performed by: INTERNAL MEDICINE

## 2018-08-02 PROCEDURE — 94729 DIFFUSING CAPACITY: CPT | Performed by: INTERNAL MEDICINE

## 2018-08-02 PROCEDURE — 94375 RESPIRATORY FLOW VOLUME LOOP: CPT | Performed by: INTERNAL MEDICINE

## 2018-08-09 ENCOUNTER — OFFICE VISIT (OUTPATIENT)
Dept: INTERNAL MEDICINE | Facility: CLINIC | Age: 53
End: 2018-08-09

## 2018-08-09 VITALS
HEIGHT: 64 IN | RESPIRATION RATE: 18 BRPM | SYSTOLIC BLOOD PRESSURE: 124 MMHG | WEIGHT: 283 LBS | DIASTOLIC BLOOD PRESSURE: 80 MMHG | BODY MASS INDEX: 48.32 KG/M2 | HEART RATE: 78 BPM

## 2018-08-09 DIAGNOSIS — L98.9 SKIN LESION OF RIGHT LEG: ICD-10-CM

## 2018-08-09 DIAGNOSIS — J44.9 CHRONIC OBSTRUCTIVE PULMONARY DISEASE, UNSPECIFIED COPD TYPE (HCC): Primary | Chronic | ICD-10-CM

## 2018-08-09 PROCEDURE — 99213 OFFICE O/P EST LOW 20 MIN: CPT | Performed by: FAMILY MEDICINE

## 2018-08-09 RX ORDER — BUDESONIDE AND FORMOTEROL FUMARATE DIHYDRATE 160; 4.5 UG/1; UG/1
2 AEROSOL RESPIRATORY (INHALATION)
Qty: 1 INHALER | Refills: 2 | Status: SHIPPED | OUTPATIENT
Start: 2018-08-09 | End: 2019-06-21 | Stop reason: SDUPTHER

## 2018-08-09 NOTE — PATIENT INSTRUCTIONS
I have ordered a infectious disease referral for you.  You will be called to set up an appointment with this specialist.  I have refilled your Breo.  Your medication has been electronically prescribed and will be at your pharmacy.  Please pick them up and take as directed.  Please continue taking your other current medications as directed.  Please follow-up as indicated.  Return to the clinic sooner if your symptoms worsen or if you have any other concerns.

## 2018-08-09 NOTE — PROGRESS NOTES
Subjective   Lisa Ledbetter is a 53 y.o. female who presents to follow-up for COPD      History of Present Illness   Patient presents to follow-up for chronic COPD management.  Was started on Breo and Anoro was discontinued.  Has been using her Breo inhaler as directed and denies any intolerable side effects.  Reports significantly improved breathing symptoms.  Has used her Albuterol inhaler twice since her last office visit.  PFTs done on 08/02/18 showed mild restriction defect with no significant airway obstruction.  Reports smoking between 1/2-3/4 PPD.    Also reports complaint of recurrent skin lesions on her lower right leg.  States that she is concerned that this is related to her recent episode of cellulitis.  Reports that she tried to follow-up with her infectious disease specialist after her recent hospital stay, but was told that her insurance would likely not cover her outpatient visit.  Requesting a referral to a different specialist today.  Does not report associated fevers, sweats, chills, bleeding, purulent discharge.  Has a history of psoriasis and psoriatic arthritis, but does not think her current lesions are related.  Admits that she has not seen her rheumatologist in a long time, but plans to schedule an appointment.    Review of Systems   Constitutional: Negative for chills and fever.   Respiratory: Positive for shortness of breath (improved) and wheezing (improved). Negative for cough.    Cardiovascular: Negative for chest pain and palpitations.   Gastrointestinal: Negative for abdominal pain, constipation, diarrhea, nausea and vomiting.   Skin:        Positive for skin lesions on right lower leg.   Neurological: Negative for dizziness, syncope and headaches.     The following portions of the patient's history were reviewed and updated as appropriate: current medications, past medical history, past social history and problem list.    Objective   /80   Pulse 78   Resp 18   Ht 162.6  "cm (64\")   Wt 128 kg (283 lb)   BMI 48.58 kg/m²      Physical Exam   Constitutional: She is oriented to person, place, and time. She appears well-developed and well-nourished.   No acute distress.   HENT:   Head: Normocephalic and atraumatic.   Eyes: Conjunctivae and EOM are normal.   Neck: Normal range of motion.   Cardiovascular: Normal rate, regular rhythm, normal heart sounds and intact distal pulses.    Pulmonary/Chest: Effort normal. No respiratory distress. She has no decreased breath sounds. She has wheezes (bilateral, mild, improved after coughing).   Abdominal: Soft. There is no tenderness.   Musculoskeletal: Normal range of motion.   Moves all extremities.   Neurological: She is alert and oriented to person, place, and time.   Skin: Skin is warm and dry.   Multiple dry, flaking plaques of varying sizes on right lower leg with no significant surrounding erythema, bleeding or purulent discharge.   Psychiatric: She has a normal mood and affect. Her behavior is normal.   Vitals reviewed.      Assessment/Plan   Lisa was seen today for copd.    Diagnoses and all orders for this visit:    Chronic obstructive pulmonary disease, unspecified COPD type (CMS/HCC)  -     Fluticasone Furoate-Vilanterol (BREO ELLIPTA) 100-25 MCG/INH aerosol powder ; Inhale 1 puff Daily.    Improved symptoms per patient report.  Reviewed recent PFT results with patient today.  Will continue current regimen today.  A refill for her medication was requested today.  Advised patient to establish care with a new PCP in 3 months for next routine follow-up or sooner if needed.    Skin lesion of right leg  -     Ambulatory Referral to Infectious Disease    Suspect that her current skin lesions may be psoriasis plaques and recommended that she discuss further management with her rheumatologist.  Patient is concerned that her skin lesions may be related to her recent episode of cellulitis and requested to see another infectious disease " specialist.  Will order a referral for infectious disease today to aid with further evaluation and management.

## 2018-11-27 ENCOUNTER — APPOINTMENT (OUTPATIENT)
Dept: GENERAL RADIOLOGY | Facility: HOSPITAL | Age: 53
End: 2018-11-27

## 2018-11-27 ENCOUNTER — HOSPITAL ENCOUNTER (EMERGENCY)
Facility: HOSPITAL | Age: 53
Discharge: HOME | End: 2018-11-27
Attending: EMERGENCY MEDICINE | Admitting: EMERGENCY MEDICINE

## 2018-11-27 ENCOUNTER — APPOINTMENT (OUTPATIENT)
Dept: CT IMAGING | Facility: HOSPITAL | Age: 53
End: 2018-11-27

## 2018-11-27 VITALS
HEART RATE: 72 BPM | OXYGEN SATURATION: 94 % | BODY MASS INDEX: 49.69 KG/M2 | RESPIRATION RATE: 18 BRPM | HEIGHT: 62 IN | SYSTOLIC BLOOD PRESSURE: 131 MMHG | WEIGHT: 270 LBS | DIASTOLIC BLOOD PRESSURE: 66 MMHG | TEMPERATURE: 97.8 F

## 2018-11-27 DIAGNOSIS — Z72.0 TOBACCO ABUSE: ICD-10-CM

## 2018-11-27 DIAGNOSIS — J98.01 BRONCHOSPASM: ICD-10-CM

## 2018-11-27 DIAGNOSIS — J18.9 PNEUMONIA OF LEFT UPPER LOBE DUE TO INFECTIOUS ORGANISM: Primary | ICD-10-CM

## 2018-11-27 LAB
ALBUMIN SERPL-MCNC: 3.98 G/DL (ref 3.2–4.8)
ALBUMIN/GLOB SERPL: 1.3 G/DL (ref 1.5–2.5)
ALP SERPL-CCNC: 77 U/L (ref 25–100)
ALT SERPL W P-5'-P-CCNC: 17 U/L (ref 7–40)
ANION GAP SERPL CALCULATED.3IONS-SCNC: 4 MMOL/L (ref 3–11)
AST SERPL-CCNC: 17 U/L (ref 0–33)
BASOPHILS # BLD AUTO: 0.01 10*3/MM3 (ref 0–0.2)
BASOPHILS NFR BLD AUTO: 0.1 % (ref 0–1)
BILIRUB SERPL-MCNC: 0.4 MG/DL (ref 0.3–1.2)
BNP SERPL-MCNC: 263 PG/ML (ref 0–100)
BUN BLD-MCNC: 14 MG/DL (ref 9–23)
BUN/CREAT SERPL: 22.6 (ref 7–25)
CALCIUM SPEC-SCNC: 9.2 MG/DL (ref 8.7–10.4)
CHLORIDE SERPL-SCNC: 106 MMOL/L (ref 99–109)
CO2 SERPL-SCNC: 28 MMOL/L (ref 20–31)
CREAT BLD-MCNC: 0.62 MG/DL (ref 0.6–1.3)
DEPRECATED RDW RBC AUTO: 47.3 FL (ref 37–54)
EOSINOPHIL # BLD AUTO: 0.02 10*3/MM3 (ref 0–0.3)
EOSINOPHIL NFR BLD AUTO: 0.3 % (ref 0–3)
ERYTHROCYTE [DISTWIDTH] IN BLOOD BY AUTOMATED COUNT: 14.3 % (ref 11.3–14.5)
GFR SERPL CREATININE-BSD FRML MDRD: 101 ML/MIN/1.73
GLOBULIN UR ELPH-MCNC: 3 GM/DL
GLUCOSE BLD-MCNC: 83 MG/DL (ref 70–100)
HCT VFR BLD AUTO: 40.8 % (ref 34.5–44)
HGB BLD-MCNC: 12.6 G/DL (ref 11.5–15.5)
HOLD SPECIMEN: NORMAL
HOLD SPECIMEN: NORMAL
IMM GRANULOCYTES # BLD: 0.03 10*3/MM3 (ref 0–0.03)
IMM GRANULOCYTES NFR BLD: 0.4 % (ref 0–0.6)
LYMPHOCYTES # BLD AUTO: 1.94 10*3/MM3 (ref 0.6–4.8)
LYMPHOCYTES NFR BLD AUTO: 25.8 % (ref 24–44)
MCH RBC QN AUTO: 27.9 PG (ref 27–31)
MCHC RBC AUTO-ENTMCNC: 30.9 G/DL (ref 32–36)
MCV RBC AUTO: 90.5 FL (ref 80–99)
MONOCYTES # BLD AUTO: 0.56 10*3/MM3 (ref 0–1)
MONOCYTES NFR BLD AUTO: 7.4 % (ref 0–12)
NEUTROPHILS # BLD AUTO: 4.99 10*3/MM3 (ref 1.5–8.3)
NEUTROPHILS NFR BLD AUTO: 66.4 % (ref 41–71)
PLATELET # BLD AUTO: 233 10*3/MM3 (ref 150–450)
PMV BLD AUTO: 9.8 FL (ref 6–12)
POTASSIUM BLD-SCNC: 3.8 MMOL/L (ref 3.5–5.5)
PROT SERPL-MCNC: 7 G/DL (ref 5.7–8.2)
RBC # BLD AUTO: 4.51 10*6/MM3 (ref 3.89–5.14)
SODIUM BLD-SCNC: 138 MMOL/L (ref 132–146)
TROPONIN I SERPL-MCNC: 0 NG/ML (ref 0–0.07)
TROPONIN I SERPL-MCNC: 0 NG/ML (ref 0–0.07)
WBC NRBC COR # BLD: 7.52 10*3/MM3 (ref 3.5–10.8)
WHOLE BLOOD HOLD SPECIMEN: NORMAL
WHOLE BLOOD HOLD SPECIMEN: NORMAL

## 2018-11-27 PROCEDURE — 0 IOPAMIDOL PER 1 ML: Performed by: EMERGENCY MEDICINE

## 2018-11-27 PROCEDURE — 85025 COMPLETE CBC W/AUTO DIFF WBC: CPT | Performed by: EMERGENCY MEDICINE

## 2018-11-27 PROCEDURE — 94640 AIRWAY INHALATION TREATMENT: CPT

## 2018-11-27 PROCEDURE — 93005 ELECTROCARDIOGRAM TRACING: CPT | Performed by: EMERGENCY MEDICINE

## 2018-11-27 PROCEDURE — 99285 EMERGENCY DEPT VISIT HI MDM: CPT

## 2018-11-27 PROCEDURE — 83880 ASSAY OF NATRIURETIC PEPTIDE: CPT | Performed by: EMERGENCY MEDICINE

## 2018-11-27 PROCEDURE — 84484 ASSAY OF TROPONIN QUANT: CPT

## 2018-11-27 PROCEDURE — 25010000002 METHYLPREDNISOLONE PER 125 MG: Performed by: EMERGENCY MEDICINE

## 2018-11-27 PROCEDURE — 80053 COMPREHEN METABOLIC PANEL: CPT | Performed by: EMERGENCY MEDICINE

## 2018-11-27 PROCEDURE — 71275 CT ANGIOGRAPHY CHEST: CPT

## 2018-11-27 PROCEDURE — 71045 X-RAY EXAM CHEST 1 VIEW: CPT

## 2018-11-27 PROCEDURE — 96374 THER/PROPH/DIAG INJ IV PUSH: CPT

## 2018-11-27 RX ORDER — METHYLPREDNISOLONE SODIUM SUCCINATE 125 MG/2ML
125 INJECTION, POWDER, LYOPHILIZED, FOR SOLUTION INTRAMUSCULAR; INTRAVENOUS ONCE
Status: COMPLETED | OUTPATIENT
Start: 2018-11-27 | End: 2018-11-27

## 2018-11-27 RX ORDER — SODIUM CHLORIDE 0.9 % (FLUSH) 0.9 %
10 SYRINGE (ML) INJECTION AS NEEDED
Status: DISCONTINUED | OUTPATIENT
Start: 2018-11-27 | End: 2018-11-27 | Stop reason: HOSPADM

## 2018-11-27 RX ORDER — IPRATROPIUM BROMIDE AND ALBUTEROL SULFATE 2.5; .5 MG/3ML; MG/3ML
3 SOLUTION RESPIRATORY (INHALATION) ONCE
Status: COMPLETED | OUTPATIENT
Start: 2018-11-27 | End: 2018-11-27

## 2018-11-27 RX ADMIN — IOPAMIDOL 80 ML: 755 INJECTION, SOLUTION INTRAVENOUS at 10:30

## 2018-11-27 RX ADMIN — METHYLPREDNISOLONE SODIUM SUCCINATE 125 MG: 125 INJECTION, POWDER, FOR SOLUTION INTRAMUSCULAR; INTRAVENOUS at 10:18

## 2018-11-27 RX ADMIN — IPRATROPIUM BROMIDE AND ALBUTEROL SULFATE 3 ML: 2.5; .5 SOLUTION RESPIRATORY (INHALATION) at 10:07

## 2018-12-20 ENCOUNTER — OFFICE VISIT (OUTPATIENT)
Dept: INTERNAL MEDICINE | Facility: CLINIC | Age: 53
End: 2018-12-20

## 2018-12-20 VITALS — BODY MASS INDEX: 52.44 KG/M2 | TEMPERATURE: 97.9 F | WEIGHT: 285 LBS | HEIGHT: 62 IN

## 2018-12-20 DIAGNOSIS — L40.50 PSORIATIC ARTHRITIS (HCC): Chronic | ICD-10-CM

## 2018-12-20 DIAGNOSIS — L03.90 CELLULITIS, UNSPECIFIED CELLULITIS SITE: ICD-10-CM

## 2018-12-20 DIAGNOSIS — B37.2 INTERTRIGINOUS CANDIDIASIS: Primary | Chronic | ICD-10-CM

## 2018-12-20 DIAGNOSIS — L50.9 HIVES: ICD-10-CM

## 2018-12-20 PROCEDURE — 99214 OFFICE O/P EST MOD 30 MIN: CPT | Performed by: INTERNAL MEDICINE

## 2018-12-20 RX ORDER — SULFAMETHOXAZOLE AND TRIMETHOPRIM 800; 160 MG/1; MG/1
1 TABLET ORAL 2 TIMES DAILY
Qty: 14 TABLET | Refills: 0 | Status: SHIPPED | OUTPATIENT
Start: 2018-12-20 | End: 2018-12-27

## 2018-12-20 RX ORDER — DIPHENHYDRAMINE HCL 25 MG
25 TABLET ORAL EVERY 6 HOURS PRN
Qty: 28 TABLET | Refills: 2 | Status: SHIPPED | OUTPATIENT
Start: 2018-12-20 | End: 2019-04-25

## 2018-12-20 NOTE — PROGRESS NOTES
"Subjective   Lisa Ledbetter is a 53 y.o. female here for candidiasis, psoriasis, psoriatic arthritis, hives. Has had chronic yeast infection under breasts, in groin, and on right LE. She has had associated cellulitis as well. She has redness on the LE and under the breast. Also has some hives or \"bites\" on left arm and on her back. Living at [facility] and has for the last year, has had bed bug infestation in the past though not currently. She denies any constitutional sx. She has hx of psoriasis which she doubts and has been dx with PA. She was on MTX though not currently. Her rheum is in [place] and doesn't have any transportation. Has severe aching pain in knees, long bones of arms. It is really worrying her.     PMH: reviewed  Meds: reviewed    Review of Systems   Constitutional: Positive for fatigue. Negative for chills and fever.   Musculoskeletal:        See hpi   Skin: Positive for color change and rash.   Neurological: Negative.          Objective   Temp 97.9 °F (36.6 °C) (Temporal)   Ht 157.5 cm (62\")   Wt 129 kg (285 lb)   BMI 52.13 kg/m²     Physical Exam   Constitutional: She is oriented to person, place, and time. She appears well-developed and well-nourished.   Pulmonary/Chest: Effort normal.   Neurological: She is alert and oriented to person, place, and time.   Skin: Skin is warm and dry.        Psychiatric: She has a normal mood and affect. Her behavior is normal. Judgment and thought content normal.   Vitals reviewed.  Left posterior arm and right thoracic area with hives. Psoriatic plaques on elbows. Erythema with possible cellulitis under both breasts. RLE with venous stasis changes, erythema, no weeping, tinea with satellite lesions    Assessment/Plan   Lisa was seen today for rash.    Diagnoses and all orders for this visit:    Intertriginous candidiasis  -continue nystatin, fluconazole, ketoconazole to affected areas  -will likely be very difficult to treat given " widespread rash on multiple areas  -may need derm though pt is generally against that    Psoriatic arthritis (CMS/HCC)  -     Ambulatory Referral to Rheumatology  -     Previously treated with MTX not currently taking    Cellulitis, unspecified cellulitis site  -     sulfamethoxazole-trimethoprim (BACTRIM DS) 800-160 MG per tablet; Take 1 tablet by mouth 2 (Two) Times a Day for 7 days.    Hives  -     diphenhydrAMINE (BENADRYL ALLERGY) 25 MG tablet; Take 1 tablet by mouth Every 6 (Six) Hours As Needed for Itching.

## 2019-03-29 ENCOUNTER — OFFICE VISIT (OUTPATIENT)
Dept: INTERNAL MEDICINE | Facility: CLINIC | Age: 54
End: 2019-03-29

## 2019-03-29 VITALS
WEIGHT: 293 LBS | HEART RATE: 77 BPM | BODY MASS INDEX: 53.92 KG/M2 | OXYGEN SATURATION: 94 % | TEMPERATURE: 98.5 F | HEIGHT: 62 IN

## 2019-03-29 DIAGNOSIS — J40 BRONCHITIS: Primary | ICD-10-CM

## 2019-03-29 DIAGNOSIS — L40.50 PSORIATIC ARTHRITIS (HCC): Chronic | ICD-10-CM

## 2019-03-29 PROCEDURE — 99213 OFFICE O/P EST LOW 20 MIN: CPT | Performed by: INTERNAL MEDICINE

## 2019-03-29 RX ORDER — PREDNISONE 50 MG/1
50 TABLET ORAL DAILY
Qty: 5 TABLET | Refills: 0 | Status: SHIPPED | OUTPATIENT
Start: 2019-03-29 | End: 2019-04-25

## 2019-03-29 RX ORDER — DOXYCYCLINE HYCLATE 100 MG
100 TABLET ORAL 2 TIMES DAILY
Qty: 14 TABLET | Refills: 0 | Status: SHIPPED | OUTPATIENT
Start: 2019-03-29 | End: 2019-04-05

## 2019-03-29 NOTE — PROGRESS NOTES
"Subjective   Lisa Ledbetter is a 53 y.o. female here for URI since Monday. She lives at the [facility] and around many sick people. She has hx of COPD. She has had severe cough, wheezing, more SOA. No fever, chills, body aches but has had fatigue. She is on Breo and albuterol for rescue. She also has PA but never got any info about referral to a new rheumatologist.    PMH: reviewed  Meds: reviewed    Review of Systems   Constitutional: Positive for activity change and fatigue. Negative for chills and fever.   HENT: Positive for congestion, postnasal drip, rhinorrhea, sinus pressure, sneezing and sore throat. Negative for ear discharge and ear pain.    Respiratory: Positive for cough, shortness of breath and wheezing.    Gastrointestinal: Negative.    Musculoskeletal: Negative.    Skin: Negative.          Objective   Pulse 77   Temp 98.5 °F (36.9 °C) (Temporal)   Ht 157.5 cm (62\")   Wt (!) 140 kg (309 lb)   SpO2 94%   BMI 56.52 kg/m²     Physical Exam   Constitutional: She is oriented to person, place, and time. She appears well-developed and well-nourished.   Cardiovascular: Normal rate, regular rhythm and normal heart sounds.   Pulmonary/Chest: Effort normal. She has wheezes. She has no rales.   Neurological: She is alert and oriented to person, place, and time.   Skin: Skin is warm and dry.   Psychiatric: She has a normal mood and affect. Her behavior is normal. Thought content normal.   Vitals reviewed.      Assessment/Plan   Lisa was seen today for bronchitis.    Diagnoses and all orders for this visit:    Bronchitis  -     doxycycline (VIBRAMYICN) 100 MG tablet; Take 1 tablet by mouth 2 (Two) Times a Day for 7 days.  -     predniSONE (DELTASONE) 50 MG tablet; Take 1 tablet by mouth Daily.    Psoriatic arthritis (CMS/[facility])  -     Ambulatory Referral to Rheumatology           "

## 2019-04-22 ENCOUNTER — TELEPHONE (OUTPATIENT)
Dept: INTERNAL MEDICINE | Facility: CLINIC | Age: 54
End: 2019-04-22

## 2019-04-22 NOTE — TELEPHONE ENCOUNTER
Called to speak with Pt about rheumatology referral, (Pt has to call and schedule, hasn't yet. Waiting on that)    During encounter Pt stated that she was diagnosed with ring worm on her leg, stated that it has moved up close to vaginal area. Now thinks she might have a yeast infection with it?    Pt would like to speak to you about getting prescriptions sent in, lack of transportation has now become a problem.    Please advise,    Thank you

## 2019-04-23 RX ORDER — FLUCONAZOLE 150 MG/1
150 TABLET ORAL ONCE
Qty: 1 TABLET | Refills: 1 | Status: SHIPPED | OUTPATIENT
Start: 2019-04-23 | End: 2019-04-23

## 2019-04-23 NOTE — TELEPHONE ENCOUNTER
Spoke with patient, she sts she is unable to make it in the office but is pretty certain she has a yeast infx. Asking to send diflucan.

## 2019-04-25 ENCOUNTER — HOSPITAL ENCOUNTER (EMERGENCY)
Facility: HOSPITAL | Age: 54
Discharge: HOME | End: 2019-04-25
Attending: EMERGENCY MEDICINE | Admitting: EMERGENCY MEDICINE

## 2019-04-25 ENCOUNTER — APPOINTMENT (OUTPATIENT)
Dept: GENERAL RADIOLOGY | Facility: HOSPITAL | Age: 54
End: 2019-04-25

## 2019-04-25 VITALS
SYSTOLIC BLOOD PRESSURE: 168 MMHG | RESPIRATION RATE: 18 BRPM | OXYGEN SATURATION: 94 % | WEIGHT: 293 LBS | TEMPERATURE: 98.1 F | HEART RATE: 81 BPM | HEIGHT: 62 IN | BODY MASS INDEX: 53.92 KG/M2 | DIASTOLIC BLOOD PRESSURE: 78 MMHG

## 2019-04-25 DIAGNOSIS — M43.6 TORTICOLLIS, ACUTE: Primary | ICD-10-CM

## 2019-04-25 PROCEDURE — 99283 EMERGENCY DEPT VISIT LOW MDM: CPT

## 2019-04-25 PROCEDURE — 25010000002 KETOROLAC TROMETHAMINE PER 15 MG: Performed by: EMERGENCY MEDICINE

## 2019-04-25 PROCEDURE — 96372 THER/PROPH/DIAG INJ SC/IM: CPT

## 2019-04-25 PROCEDURE — 25010000002 PROMETHAZINE PER 50 MG: Performed by: EMERGENCY MEDICINE

## 2019-04-25 PROCEDURE — 25010000002 HYDROMORPHONE 1 MG/ML SOLUTION: Performed by: EMERGENCY MEDICINE

## 2019-04-25 PROCEDURE — 72040 X-RAY EXAM NECK SPINE 2-3 VW: CPT

## 2019-04-25 RX ORDER — PROMETHAZINE HYDROCHLORIDE 25 MG/ML
25 INJECTION, SOLUTION INTRAMUSCULAR; INTRAVENOUS ONCE
Status: COMPLETED | OUTPATIENT
Start: 2019-04-25 | End: 2019-04-25

## 2019-04-25 RX ORDER — KETOROLAC TROMETHAMINE 30 MG/ML
60 INJECTION, SOLUTION INTRAMUSCULAR; INTRAVENOUS ONCE
Status: COMPLETED | OUTPATIENT
Start: 2019-04-25 | End: 2019-04-25

## 2019-04-25 RX ORDER — CYCLOBENZAPRINE HCL 10 MG
10 TABLET ORAL 3 TIMES DAILY PRN
Qty: 12 TABLET | Refills: 0 | Status: SHIPPED | OUTPATIENT
Start: 2019-04-25 | End: 2019-07-18 | Stop reason: SDUPTHER

## 2019-04-25 RX ADMIN — KETOROLAC TROMETHAMINE 60 MG: 30 INJECTION INTRAMUSCULAR; INTRAVENOUS at 19:53

## 2019-04-25 RX ADMIN — PROMETHAZINE HYDROCHLORIDE 25 MG: 25 INJECTION, SOLUTION INTRAMUSCULAR; INTRAVENOUS at 19:54

## 2019-04-25 RX ADMIN — HYDROMORPHONE HYDROCHLORIDE 1 MG: 1 INJECTION, SOLUTION INTRAMUSCULAR; INTRAVENOUS; SUBCUTANEOUS at 19:54

## 2019-06-21 ENCOUNTER — TELEPHONE (OUTPATIENT)
Dept: INTERNAL MEDICINE | Facility: CLINIC | Age: 54
End: 2019-06-21

## 2019-06-21 DIAGNOSIS — J44.9 CHRONIC OBSTRUCTIVE PULMONARY DISEASE, UNSPECIFIED COPD TYPE (HCC): Chronic | ICD-10-CM

## 2019-06-21 RX ORDER — BUDESONIDE AND FORMOTEROL FUMARATE DIHYDRATE 160; 4.5 UG/1; UG/1
2 AEROSOL RESPIRATORY (INHALATION)
Qty: 1 INHALER | Refills: 2 | Status: SHIPPED | OUTPATIENT
Start: 2019-06-21 | End: 2019-08-09

## 2019-07-18 ENCOUNTER — OFFICE VISIT (OUTPATIENT)
Dept: INTERNAL MEDICINE | Facility: CLINIC | Age: 54
End: 2019-07-18

## 2019-07-18 VITALS
SYSTOLIC BLOOD PRESSURE: 148 MMHG | OXYGEN SATURATION: 99 % | HEART RATE: 75 BPM | WEIGHT: 293 LBS | HEIGHT: 62 IN | DIASTOLIC BLOOD PRESSURE: 88 MMHG | RESPIRATION RATE: 20 BRPM | TEMPERATURE: 98.2 F | BODY MASS INDEX: 53.92 KG/M2

## 2019-07-18 DIAGNOSIS — R06.02 SHORTNESS OF BREATH: ICD-10-CM

## 2019-07-18 DIAGNOSIS — J44.9 CHRONIC OBSTRUCTIVE PULMONARY DISEASE, UNSPECIFIED COPD TYPE (HCC): Chronic | ICD-10-CM

## 2019-07-18 DIAGNOSIS — M62.838 MUSCLE SPASM: ICD-10-CM

## 2019-07-18 DIAGNOSIS — J01.90 ACUTE NON-RECURRENT SINUSITIS, UNSPECIFIED LOCATION: ICD-10-CM

## 2019-07-18 DIAGNOSIS — M54.2 NECK PAIN: ICD-10-CM

## 2019-07-18 DIAGNOSIS — J40 BRONCHITIS: Primary | ICD-10-CM

## 2019-07-18 PROCEDURE — 99214 OFFICE O/P EST MOD 30 MIN: CPT | Performed by: NURSE PRACTITIONER

## 2019-07-18 RX ORDER — ALBUTEROL SULFATE 90 UG/1
2 AEROSOL, METERED RESPIRATORY (INHALATION) EVERY 6 HOURS PRN
Qty: 1 INHALER | Refills: 11 | Status: SHIPPED | OUTPATIENT
Start: 2019-07-18

## 2019-07-18 RX ORDER — DOXYCYCLINE 100 MG/1
100 CAPSULE ORAL 2 TIMES DAILY
Qty: 20 CAPSULE | Refills: 0 | Status: SHIPPED | OUTPATIENT
Start: 2019-07-18 | End: 2019-07-28

## 2019-07-18 RX ORDER — ALBUTEROL SULFATE 2.5 MG/3ML
2.5 SOLUTION RESPIRATORY (INHALATION) EVERY 4 HOURS PRN
Qty: 90 VIAL | Refills: 11 | Status: SHIPPED | OUTPATIENT
Start: 2019-07-18

## 2019-07-18 RX ORDER — CYCLOBENZAPRINE HCL 10 MG
10 TABLET ORAL 3 TIMES DAILY PRN
Qty: 90 TABLET | Refills: 1 | Status: SHIPPED | OUTPATIENT
Start: 2019-07-18 | End: 2019-08-09

## 2019-07-18 RX ORDER — KETOCONAZOLE 20 MG/ML
SHAMPOO TOPICAL
Refills: 3 | COMMUNITY
Start: 2019-07-09

## 2019-08-09 ENCOUNTER — OFFICE VISIT (OUTPATIENT)
Dept: INTERNAL MEDICINE | Facility: CLINIC | Age: 54
End: 2019-08-09

## 2019-08-09 VITALS
BODY MASS INDEX: 53.92 KG/M2 | HEIGHT: 62 IN | SYSTOLIC BLOOD PRESSURE: 134 MMHG | TEMPERATURE: 97.9 F | DIASTOLIC BLOOD PRESSURE: 80 MMHG | WEIGHT: 293 LBS

## 2019-08-09 DIAGNOSIS — M62.838 MUSCLE SPASMS OF BOTH LOWER EXTREMITIES: ICD-10-CM

## 2019-08-09 DIAGNOSIS — E66.01 MORBID OBESITY WITH BMI OF 50.0-59.9, ADULT (HCC): Chronic | ICD-10-CM

## 2019-08-09 DIAGNOSIS — I10 ESSENTIAL HYPERTENSION: Chronic | ICD-10-CM

## 2019-08-09 DIAGNOSIS — L40.50 PSORIATIC ARTHRITIS (HCC): Primary | Chronic | ICD-10-CM

## 2019-08-09 DIAGNOSIS — J44.9 CHRONIC OBSTRUCTIVE PULMONARY DISEASE, UNSPECIFIED COPD TYPE (HCC): Chronic | ICD-10-CM

## 2019-08-09 PROBLEM — Z59.00 HOMELESSNESS: Chronic | Status: RESOLVED | Noted: 2018-04-24 | Resolved: 2019-08-09

## 2019-08-09 PROCEDURE — 99214 OFFICE O/P EST MOD 30 MIN: CPT | Performed by: INTERNAL MEDICINE

## 2019-08-09 RX ORDER — TIZANIDINE 4 MG/1
4 TABLET ORAL EVERY 8 HOURS PRN
Qty: 21 TABLET | Refills: 0 | Status: SHIPPED | OUTPATIENT
Start: 2019-08-09

## 2019-08-09 NOTE — PROGRESS NOTES
"Subjective   Lisa Ledbetter is a 54 y.o. female here for follow-up psoriasis with psoriatic arthritis, HTN, COPD. She has seen a new rheum who told her she doesn't have PA. She was treated in the past with MTX for this. She has major joint stiffness and pain and isn't happy with her dx of OA and weight-related pain. She wants to see a new rheum. She has seen derm who rec humira but she is against that as well as she doesn't want to be immunocompromised with her hx of recurrent infections. HTN: no issues, has been at goal. Takes meds as prescribed. She has been helping take care of her landlord so she is a bit stressed. COPD: doing well on inhaler, wants rx for Breo.      The following portions of the patient's history were reviewed and updated as appropriate: allergies, current medications, past medical history, past social history and problem list.    Review of Systems:  General: negative  CV: negative  Respiratory: negative  Psych: negative  Skin: psoriasis  Rheum: see hpi  MSK: see hpi    Objective   /80 (BP Location: Left arm, Patient Position: Sitting, Cuff Size: Large Adult)   Temp 97.9 °F (36.6 °C) (Temporal)   Ht 157.5 cm (62\")   Wt (!) 148 kg (327 lb)   BMI 59.81 kg/m²     Physical Exam   Constitutional: She is oriented to person, place, and time. She appears well-developed and well-nourished.   Cardiovascular: Normal rate, regular rhythm and normal heart sounds.   Pulmonary/Chest: Effort normal and breath sounds normal. She has no wheezes. She has no rales.   Neurological: She is alert and oriented to person, place, and time.   Skin: Skin is warm and dry.   Psychiatric: She has a normal mood and affect. Her behavior is normal. Thought content normal.   Vitals reviewed.      Assessment/Plan   Lisa was seen today for hypertension and hyperlipidemia.    Diagnoses and all orders for this visit:    Psoriatic arthritis (CMS/[facility])  -     Ambulatory Referral to Rheumatology    Chronic obstructive " pulmonary disease, unspecified COPD type (CMS/[facility])  -     Fluticasone Furoate-Vilanterol (BREO ELLIPTA) 100-25 MCG/INH inhaler; Inhale 1 puff Daily.    Muscle spasms of both lower extremities  -     tiZANidine (ZANAFLEX) 4 MG tablet; Take 1 tablet by mouth Every 8 (Eight) Hours As Needed for Muscle Spasms.    Essential hypertension  -acceptable, continue current meds    Morbid obesity with BMI of 50.0-59.9, adult (CMS/[facility])  Body mass index is 59.81 kg/m².  -complicates all aspects of care

## 2019-08-12 DIAGNOSIS — J44.9 CHRONIC OBSTRUCTIVE PULMONARY DISEASE, UNSPECIFIED COPD TYPE (HCC): Chronic | ICD-10-CM

## 2019-08-13 ENCOUNTER — TELEPHONE (OUTPATIENT)
Dept: INTERNAL MEDICINE | Facility: CLINIC | Age: 54
End: 2019-08-13

## 2019-08-14 RX ORDER — LEVOCETIRIZINE DIHYDROCHLORIDE 5 MG/1
5 TABLET, FILM COATED ORAL EVERY EVENING
Qty: 30 TABLET | Refills: 11 | Status: SHIPPED | OUTPATIENT
Start: 2019-08-14

## 2023-01-24 NOTE — PROGRESS NOTES
Subjective   Chief Complaint   Patient presents with   • URI     x2 days      Lisa Ledbetter is a 54 y.o. female here today for two day history of upper lower airway congestion, sore throat, headache, facial pain, ear fullness, cough, fatigue, and body aches. Cough has been loose and productive of yellowish sputum. She has been excessively sweating and feels like she's been running a fever. She's had restless sleep. Wheezing and shortness of breath has been worse. Inhalers have not been helping much. She does not have a nebulizer at home. She woke up this morning having muscle spasms on the left side of her neck radiating into the shoulder. Pain improves with stretching and heating pad. She denies any chest pain. She is supposed to start Humira for psoratic arthritis next week. She will not start this until antibiotic is completed and symptoms have improved.     The following portions of the patient's history were reviewed and updated as appropriate: allergies, current medications, past family history, past medical history, past social history, past surgical history and problem list.    Review of Systems   Constitutional: Positive for diaphoresis, fatigue and fever. Negative for chills.   HENT: Positive for congestion, ear pain, postnasal drip, rhinorrhea, sinus pressure and sore throat. Negative for trouble swallowing and voice change.    Eyes: Positive for pain.   Respiratory: Positive for cough, chest tightness and wheezing. Negative for shortness of breath.    Cardiovascular: Negative for chest pain.   Gastrointestinal: Negative for nausea.   Musculoskeletal: Positive for neck pain and neck stiffness.   Skin: Negative for rash and bruise.   Neurological: Positive for headache. Negative for dizziness and syncope.   Hematological: Negative for adenopathy.       Current Outpatient Medications on File Prior to Visit   Medication Sig   • budesonide-formoterol (SYMBICORT) 160-4.5 MCG/ACT inhaler Inhale 2 puffs 2  "(Two) Times a Day.   • diclofenac (VOLTAREN) 50 MG EC tablet Take 1 tablet by mouth 3 (Three) Times a Day.   • folic acid (FOLVITE) 1 MG tablet Take 1 mg by mouth Daily.   • halobetasol (ULTRAVATE) 0.05 % cream APPLY AA BID UTD   • HUMIRA PEN-PS/UV/ADOL HS START 80 MG/0.8ML & 40MG/0.4ML Pen-injector Kit    • ketoconazole (NIZORAL) 2 % shampoo SHAMPOO INTO SCALP WHEN WASHING HAIR. ALLOW TO SIT 3-5 MINUTES BEFORE RINSE   • methotrexate 2.5 MG tablet Take  by mouth 3 (Three) Doses Each Week. Take Doses 12 (Twelve) Hours Apart.     No current facility-administered medications on file prior to visit.        Objective   Vitals:    07/18/19 1438   BP: 148/88   BP Location: Left arm   Patient Position: Sitting   Cuff Size: Large Adult   Pulse: 75   Resp: 20   Temp: 98.2 °F (36.8 °C)   TempSrc: Temporal   SpO2: 99%   Weight: (!) 145 kg (320 lb 4 oz)   Height: 157.5 cm (62\")   PainSc:   7   PainLoc: Head     Body mass index is 58.57 kg/m².    Physical Exam   Constitutional: She is oriented to person, place, and time. She appears well-developed and well-nourished.   HENT:   Head: Normocephalic and atraumatic.   Nose: Mucosal edema, rhinorrhea and congestion present. Right sinus exhibits maxillary sinus tenderness and frontal sinus tenderness. Left sinus exhibits maxillary sinus tenderness and frontal sinus tenderness.   Mouth/Throat: Uvula is midline and mucous membranes are normal. Posterior oropharyngeal erythema present.   Eyes: EOM are normal. Pupils are equal, round, and reactive to light.   Neck: Normal range of motion. Muscular tenderness present.   Cardiovascular: Normal rate, regular rhythm and normal heart sounds.   Pulmonary/Chest: Effort normal. She has wheezes in the right middle field, the right lower field, the left middle field and the left lower field. She has rhonchi in the right lower field and the left lower field.   Abdominal: Soft. Bowel sounds are normal.   Musculoskeletal:        Cervical back: She " exhibits tenderness, pain and spasm.   Neurological: She is alert and oriented to person, place, and time. She has normal strength. GCS eye subscore is 4. GCS verbal subscore is 5. GCS motor subscore is 6.   Skin: Skin is warm and dry.   Psychiatric: She has a normal mood and affect. Her speech is normal and behavior is normal. Thought content normal.   Vitals reviewed.      Assessment/Plan   Lisa was seen today for uri.    Diagnoses and all orders for this visit:    Bronchitis  -     doxycycline (MONODOX) 100 MG capsule; Take 1 capsule by mouth 2 (Two) Times a Day for 10 days.  -     albuterol sulfate  (90 Base) MCG/ACT inhaler; Inhale 2 puffs Every 6 (Six) Hours As Needed for Wheezing or Shortness of Air.  -     albuterol (PROVENTIL) (2.5 MG/3ML) 0.083% nebulizer solution; Take 2.5 mg by nebulization Every 4 (Four) Hours As Needed for Wheezing or Shortness of Air.    Acute non-recurrent sinusitis, unspecified location  -     doxycycline (MONODOX) 100 MG capsule; Take 1 capsule by mouth 2 (Two) Times a Day for 10 days.    Muscle spasm  -     cyclobenzaprine (FLEXERIL) 10 MG tablet; Take 1 tablet by mouth 3 (Three) Times a Day As Needed for Muscle Spasms.    Chronic obstructive pulmonary disease, unspecified COPD type (CMS/[facility])  -     albuterol sulfate  (90 Base) MCG/ACT inhaler; Inhale 2 puffs Every 6 (Six) Hours As Needed for Wheezing or Shortness of Air.  -     albuterol (PROVENTIL) (2.5 MG/3ML) 0.083% nebulizer solution; Take 2.5 mg by nebulization Every 4 (Four) Hours As Needed for Wheezing or Shortness of Air.    Neck pain  -     cyclobenzaprine (FLEXERIL) 10 MG tablet; Take 1 tablet by mouth 3 (Three) Times a Day As Needed for Muscle Spasms.    Shortness of breath  -     albuterol sulfate  (90 Base) MCG/ACT inhaler; Inhale 2 puffs Every 6 (Six) Hours As Needed for Wheezing or Shortness of Air.  -     albuterol (PROVENTIL) (2.5 MG/3ML) 0.083% nebulizer solution; Take 2.5 mg by  nebulization Every 4 (Four) Hours As Needed for Wheezing or Shortness of Air.           Current Outpatient Medications:   •  albuterol sulfate  (90 Base) MCG/ACT inhaler, Inhale 2 puffs Every 6 (Six) Hours As Needed for Wheezing or Shortness of Air., Disp: 1 inhaler, Rfl: 11  •  budesonide-formoterol (SYMBICORT) 160-4.5 MCG/ACT inhaler, Inhale 2 puffs 2 (Two) Times a Day., Disp: 1 inhaler, Rfl: 2  •  diclofenac (VOLTAREN) 50 MG EC tablet, Take 1 tablet by mouth 3 (Three) Times a Day., Disp: 15 tablet, Rfl: 0  •  folic acid (FOLVITE) 1 MG tablet, Take 1 mg by mouth Daily., Disp: , Rfl:   •  halobetasol (ULTRAVATE) 0.05 % cream, APPLY AA BID UTD, Disp: , Rfl: 2  •  HUMIRA PEN-PS/UV/ADOL HS START 80 MG/0.8ML & 40MG/0.4ML Pen-injector Kit, , Disp: , Rfl:   •  ketoconazole (NIZORAL) 2 % shampoo, SHAMPOO INTO SCALP WHEN WASHING HAIR. ALLOW TO SIT 3-5 MINUTES BEFORE RINSE, Disp: , Rfl: 3  •  methotrexate 2.5 MG tablet, Take  by mouth 3 (Three) Doses Each Week. Take Doses 12 (Twelve) Hours Apart., Disp: , Rfl:   •  albuterol (PROVENTIL) (2.5 MG/3ML) 0.083% nebulizer solution, Take 2.5 mg by nebulization Every 4 (Four) Hours As Needed for Wheezing or Shortness of Air., Disp: 90 vial, Rfl: 11  •  cyclobenzaprine (FLEXERIL) 10 MG tablet, Take 1 tablet by mouth 3 (Three) Times a Day As Needed for Muscle Spasms., Disp: 90 tablet, Rfl: 1  •  doxycycline (MONODOX) 100 MG capsule, Take 1 capsule by mouth 2 (Two) Times a Day for 10 days., Disp: 20 capsule, Rfl: 0       Plan of care reviewed with the patient at the conclusion of today's visit.  Education was provided regarding diagnosis, management, and any prescribed or recommended OTC medications.  Patient verbalized understanding of and agreement with management plan.     Return if symptoms worsen or fail to improve.      Raquel Aj DePaul, APRN   .